# Patient Record
Sex: MALE | Race: WHITE | URBAN - METROPOLITAN AREA
[De-identification: names, ages, dates, MRNs, and addresses within clinical notes are randomized per-mention and may not be internally consistent; named-entity substitution may affect disease eponyms.]

---

## 2018-07-16 ENCOUNTER — OUTPATIENT (OUTPATIENT)
Dept: OUTPATIENT SERVICES | Facility: HOSPITAL | Age: 63
LOS: 1 days | Discharge: HOME | End: 2018-07-16

## 2018-07-16 DIAGNOSIS — I25.10 ATHEROSCLEROTIC HEART DISEASE OF NATIVE CORONARY ARTERY WITHOUT ANGINA PECTORIS: ICD-10-CM

## 2018-07-16 DIAGNOSIS — Z01.810 ENCOUNTER FOR PREPROCEDURAL CARDIOVASCULAR EXAMINATION: ICD-10-CM

## 2018-07-16 DIAGNOSIS — Z79.01 LONG TERM (CURRENT) USE OF ANTICOAGULANTS: ICD-10-CM

## 2018-07-20 ENCOUNTER — INPATIENT (INPATIENT)
Facility: HOSPITAL | Age: 63
LOS: 3 days | Discharge: HOME | End: 2018-07-24
Attending: THORACIC SURGERY (CARDIOTHORACIC VASCULAR SURGERY) | Admitting: THORACIC SURGERY (CARDIOTHORACIC VASCULAR SURGERY)

## 2018-07-20 VITALS
OXYGEN SATURATION: 100 % | HEART RATE: 62 BPM | DIASTOLIC BLOOD PRESSURE: 89 MMHG | HEIGHT: 68 IN | SYSTOLIC BLOOD PRESSURE: 140 MMHG | WEIGHT: 209.66 LBS | RESPIRATION RATE: 20 BRPM

## 2018-07-20 LAB
ABO RH CONFIRMATION: SIGNIFICANT CHANGE UP
ALBUMIN SERPL ELPH-MCNC: 4 G/DL — SIGNIFICANT CHANGE UP (ref 3.5–5.2)
ALBUMIN SERPL ELPH-MCNC: 4.2 G/DL — SIGNIFICANT CHANGE UP (ref 3.5–5.2)
ALP SERPL-CCNC: 25 U/L — LOW (ref 30–115)
ALP SERPL-CCNC: 44 U/L — SIGNIFICANT CHANGE UP (ref 30–115)
ALT FLD-CCNC: 15 U/L — SIGNIFICANT CHANGE UP (ref 0–41)
ALT FLD-CCNC: 25 U/L — SIGNIFICANT CHANGE UP (ref 0–41)
ANION GAP SERPL CALC-SCNC: 13 MMOL/L — SIGNIFICANT CHANGE UP (ref 7–14)
ANION GAP SERPL CALC-SCNC: 15 MMOL/L — HIGH (ref 7–14)
ANION GAP SERPL CALC-SCNC: 15 MMOL/L — HIGH (ref 7–14)
APTT BLD: 25 SEC — LOW (ref 27–39.2)
AST SERPL-CCNC: 19 U/L — SIGNIFICANT CHANGE UP (ref 0–41)
AST SERPL-CCNC: 28 U/L — SIGNIFICANT CHANGE UP (ref 0–41)
BASOPHILS # BLD AUTO: 0.04 K/UL — SIGNIFICANT CHANGE UP (ref 0–0.2)
BASOPHILS NFR BLD AUTO: 0.4 % — SIGNIFICANT CHANGE UP (ref 0–1)
BILIRUB SERPL-MCNC: 0.4 MG/DL — SIGNIFICANT CHANGE UP (ref 0.2–1.2)
BILIRUB SERPL-MCNC: 0.5 MG/DL — SIGNIFICANT CHANGE UP (ref 0.2–1.2)
BLD GP AB SCN SERPL QL: SIGNIFICANT CHANGE UP
BUN SERPL-MCNC: 10 MG/DL — SIGNIFICANT CHANGE UP (ref 10–20)
BUN SERPL-MCNC: 10 MG/DL — SIGNIFICANT CHANGE UP (ref 10–20)
BUN SERPL-MCNC: 13 MG/DL — SIGNIFICANT CHANGE UP (ref 10–20)
CALCIUM SERPL-MCNC: 7.5 MG/DL — LOW (ref 8.5–10.1)
CALCIUM SERPL-MCNC: 8.1 MG/DL — LOW (ref 8.5–10.1)
CALCIUM SERPL-MCNC: 8.1 MG/DL — LOW (ref 8.5–10.1)
CHLORIDE SERPL-SCNC: 100 MMOL/L — SIGNIFICANT CHANGE UP (ref 98–110)
CHLORIDE SERPL-SCNC: 108 MMOL/L — SIGNIFICANT CHANGE UP (ref 98–110)
CHLORIDE SERPL-SCNC: 108 MMOL/L — SIGNIFICANT CHANGE UP (ref 98–110)
CO2 SERPL-SCNC: 20 MMOL/L — SIGNIFICANT CHANGE UP (ref 17–32)
CO2 SERPL-SCNC: 22 MMOL/L — SIGNIFICANT CHANGE UP (ref 17–32)
CO2 SERPL-SCNC: 23 MMOL/L — SIGNIFICANT CHANGE UP (ref 17–32)
CREAT SERPL-MCNC: 1.2 MG/DL — SIGNIFICANT CHANGE UP (ref 0.7–1.5)
EOSINOPHIL # BLD AUTO: 0.11 K/UL — SIGNIFICANT CHANGE UP (ref 0–0.7)
EOSINOPHIL NFR BLD AUTO: 1 % — SIGNIFICANT CHANGE UP (ref 0–8)
GAS PNL BLDA: SIGNIFICANT CHANGE UP
GLUCOSE SERPL-MCNC: 122 MG/DL — HIGH (ref 70–99)
GLUCOSE SERPL-MCNC: 140 MG/DL — HIGH (ref 70–99)
GLUCOSE SERPL-MCNC: 166 MG/DL — HIGH (ref 70–99)
HCT VFR BLD CALC: 27.7 % — LOW (ref 42–52)
HCT VFR BLD CALC: 30.9 % — LOW (ref 42–52)
HCT VFR BLD CALC: 37 % — LOW (ref 42–52)
HGB BLD-MCNC: 11 G/DL — LOW (ref 14–18)
HGB BLD-MCNC: 13.3 G/DL — LOW (ref 14–18)
HGB BLD-MCNC: 9.9 G/DL — LOW (ref 14–18)
IMM GRANULOCYTES NFR BLD AUTO: 0.8 % — HIGH (ref 0.1–0.3)
INR BLD: 1.24 RATIO — SIGNIFICANT CHANGE UP (ref 0.65–1.3)
INR BLD: 1.53 RATIO — HIGH (ref 0.65–1.3)
LYMPHOCYTES # BLD AUTO: 1.91 K/UL — SIGNIFICANT CHANGE UP (ref 1.2–3.4)
LYMPHOCYTES # BLD AUTO: 17 % — LOW (ref 20.5–51.1)
MCHC RBC-ENTMCNC: 32.3 PG — HIGH (ref 27–31)
MCHC RBC-ENTMCNC: 32.4 PG — HIGH (ref 27–31)
MCHC RBC-ENTMCNC: 32.5 PG — HIGH (ref 27–31)
MCHC RBC-ENTMCNC: 35.6 G/DL — SIGNIFICANT CHANGE UP (ref 32–37)
MCHC RBC-ENTMCNC: 35.7 G/DL — SIGNIFICANT CHANGE UP (ref 32–37)
MCHC RBC-ENTMCNC: 35.9 G/DL — SIGNIFICANT CHANGE UP (ref 32–37)
MCV RBC AUTO: 90.2 FL — SIGNIFICANT CHANGE UP (ref 80–94)
MCV RBC AUTO: 90.6 FL — SIGNIFICANT CHANGE UP (ref 80–94)
MCV RBC AUTO: 90.8 FL — SIGNIFICANT CHANGE UP (ref 80–94)
MONOCYTES # BLD AUTO: 0.43 K/UL — SIGNIFICANT CHANGE UP (ref 0.1–0.6)
MONOCYTES NFR BLD AUTO: 3.8 % — SIGNIFICANT CHANGE UP (ref 1.7–9.3)
NEUTROPHILS # BLD AUTO: 8.66 K/UL — HIGH (ref 1.4–6.5)
NEUTROPHILS NFR BLD AUTO: 77 % — HIGH (ref 42.2–75.2)
NRBC # BLD: 0 /100 WBCS — SIGNIFICANT CHANGE UP (ref 0–0)
PLAT MORPH BLD: NORMAL — SIGNIFICANT CHANGE UP
PLATELET # BLD AUTO: 103 K/UL — LOW (ref 130–400)
PLATELET # BLD AUTO: 168 K/UL — SIGNIFICANT CHANGE UP (ref 130–400)
PLATELET # BLD AUTO: 188 K/UL — SIGNIFICANT CHANGE UP (ref 130–400)
POTASSIUM SERPL-MCNC: 4.1 MMOL/L — SIGNIFICANT CHANGE UP (ref 3.5–5)
POTASSIUM SERPL-MCNC: 4.2 MMOL/L — SIGNIFICANT CHANGE UP (ref 3.5–5)
POTASSIUM SERPL-MCNC: 5 MMOL/L — SIGNIFICANT CHANGE UP (ref 3.5–5)
POTASSIUM SERPL-SCNC: 4.1 MMOL/L — SIGNIFICANT CHANGE UP (ref 3.5–5)
POTASSIUM SERPL-SCNC: 4.2 MMOL/L — SIGNIFICANT CHANGE UP (ref 3.5–5)
POTASSIUM SERPL-SCNC: 5 MMOL/L — SIGNIFICANT CHANGE UP (ref 3.5–5)
PROT SERPL-MCNC: 5.4 G/DL — LOW (ref 6–8)
PROT SERPL-MCNC: 6.3 G/DL — SIGNIFICANT CHANGE UP (ref 6–8)
PROTHROM AB SERPL-ACNC: 13.4 SEC — HIGH (ref 9.95–12.87)
PROTHROM AB SERPL-ACNC: 16.5 SEC — HIGH (ref 9.95–12.87)
RBC # BLD: 3.05 M/UL — LOW (ref 4.7–6.1)
RBC # BLD: 3.41 M/UL — LOW (ref 4.7–6.1)
RBC # BLD: 4.1 M/UL — LOW (ref 4.7–6.1)
RBC # FLD: 12.7 % — SIGNIFICANT CHANGE UP (ref 11.5–14.5)
RBC # FLD: 12.7 % — SIGNIFICANT CHANGE UP (ref 11.5–14.5)
RBC # FLD: 12.8 % — SIGNIFICANT CHANGE UP (ref 11.5–14.5)
RBC BLD AUTO: NORMAL — SIGNIFICANT CHANGE UP
SODIUM SERPL-SCNC: 135 MMOL/L — SIGNIFICANT CHANGE UP (ref 135–146)
SODIUM SERPL-SCNC: 144 MMOL/L — SIGNIFICANT CHANGE UP (ref 135–146)
SODIUM SERPL-SCNC: 145 MMOL/L — SIGNIFICANT CHANGE UP (ref 135–146)
TYPE + AB SCN PNL BLD: SIGNIFICANT CHANGE UP
WBC # BLD: 11.24 K/UL — HIGH (ref 4.8–10.8)
WBC # BLD: 5.52 K/UL — SIGNIFICANT CHANGE UP (ref 4.8–10.8)
WBC # BLD: 8.51 K/UL — SIGNIFICANT CHANGE UP (ref 4.8–10.8)
WBC # FLD AUTO: 11.24 K/UL — HIGH (ref 4.8–10.8)
WBC # FLD AUTO: 5.52 K/UL — SIGNIFICANT CHANGE UP (ref 4.8–10.8)
WBC # FLD AUTO: 8.51 K/UL — SIGNIFICANT CHANGE UP (ref 4.8–10.8)

## 2018-07-20 RX ORDER — AMIODARONE HYDROCHLORIDE 400 MG/1
150 TABLET ORAL ONCE
Qty: 0 | Refills: 0 | Status: COMPLETED | OUTPATIENT
Start: 2018-07-20 | End: 2018-07-20

## 2018-07-20 RX ORDER — ASPIRIN/CALCIUM CARB/MAGNESIUM 324 MG
325 TABLET ORAL DAILY
Qty: 0 | Refills: 0 | Status: DISCONTINUED | OUTPATIENT
Start: 2018-07-20 | End: 2018-07-24

## 2018-07-20 RX ORDER — ALBUMIN HUMAN 25 %
1500 VIAL (ML) INTRAVENOUS ONCE
Qty: 0 | Refills: 0 | Status: COMPLETED | OUTPATIENT
Start: 2018-07-20 | End: 2018-07-20

## 2018-07-20 RX ORDER — NITROGLYCERIN 6.5 MG
15 CAPSULE, EXTENDED RELEASE ORAL
Qty: 50 | Refills: 0 | Status: DISCONTINUED | OUTPATIENT
Start: 2018-07-20 | End: 2018-07-21

## 2018-07-20 RX ORDER — ALBUMIN HUMAN 25 %
2000 VIAL (ML) INTRAVENOUS ONCE
Qty: 0 | Refills: 0 | Status: DISCONTINUED | OUTPATIENT
Start: 2018-07-20 | End: 2018-07-20

## 2018-07-20 RX ORDER — IPRATROPIUM BROMIDE 0.2 MG/ML
2 SOLUTION, NON-ORAL INHALATION EVERY 6 HOURS
Qty: 0 | Refills: 0 | Status: DISCONTINUED | OUTPATIENT
Start: 2018-07-20 | End: 2018-07-22

## 2018-07-20 RX ORDER — PHENYLEPHRINE HYDROCHLORIDE 10 MG/ML
0.1 INJECTION INTRAVENOUS
Qty: 40 | Refills: 0 | Status: DISCONTINUED | OUTPATIENT
Start: 2018-07-20 | End: 2018-07-21

## 2018-07-20 RX ORDER — DOCUSATE SODIUM 100 MG
100 CAPSULE ORAL THREE TIMES A DAY
Qty: 0 | Refills: 0 | Status: DISCONTINUED | OUTPATIENT
Start: 2018-07-20 | End: 2018-07-24

## 2018-07-20 RX ORDER — FENTANYL CITRATE 50 UG/ML
50 INJECTION INTRAVENOUS ONCE
Qty: 0 | Refills: 0 | Status: DISCONTINUED | OUTPATIENT
Start: 2018-07-20 | End: 2018-07-20

## 2018-07-20 RX ORDER — POTASSIUM CHLORIDE 20 MEQ
20 PACKET (EA) ORAL ONCE
Qty: 0 | Refills: 0 | Status: COMPLETED | OUTPATIENT
Start: 2018-07-20 | End: 2018-07-20

## 2018-07-20 RX ORDER — ALBUMIN HUMAN 25 %
1000 VIAL (ML) INTRAVENOUS ONCE
Qty: 0 | Refills: 0 | Status: DISCONTINUED | OUTPATIENT
Start: 2018-07-20 | End: 2018-07-21

## 2018-07-20 RX ORDER — SENNA PLUS 8.6 MG/1
1 TABLET ORAL EVERY 12 HOURS
Qty: 0 | Refills: 0 | Status: DISCONTINUED | OUTPATIENT
Start: 2018-07-20 | End: 2018-07-24

## 2018-07-20 RX ORDER — NOREPINEPHRINE BITARTRATE/D5W 8 MG/250ML
0.05 PLASTIC BAG, INJECTION (ML) INTRAVENOUS
Qty: 8 | Refills: 0 | Status: DISCONTINUED | OUTPATIENT
Start: 2018-07-20 | End: 2018-07-24

## 2018-07-20 RX ORDER — INSULIN HUMAN 100 [IU]/ML
1 INJECTION, SOLUTION SUBCUTANEOUS
Qty: 100 | Refills: 0 | Status: DISCONTINUED | OUTPATIENT
Start: 2018-07-20 | End: 2018-07-23

## 2018-07-20 RX ORDER — AMIODARONE HYDROCHLORIDE 400 MG/1
0.5 TABLET ORAL
Qty: 900 | Refills: 0 | Status: DISCONTINUED | OUTPATIENT
Start: 2018-07-20 | End: 2018-07-23

## 2018-07-20 RX ORDER — DEXMEDETOMIDINE HYDROCHLORIDE IN 0.9% SODIUM CHLORIDE 4 UG/ML
0.5 INJECTION INTRAVENOUS
Qty: 200 | Refills: 0 | Status: DISCONTINUED | OUTPATIENT
Start: 2018-07-20 | End: 2018-07-21

## 2018-07-20 RX ORDER — ACETAMINOPHEN 500 MG
650 TABLET ORAL EVERY 6 HOURS
Qty: 0 | Refills: 0 | Status: DISCONTINUED | OUTPATIENT
Start: 2018-07-20 | End: 2018-07-21

## 2018-07-20 RX ORDER — DEXTROSE 50 % IN WATER 50 %
25 SYRINGE (ML) INTRAVENOUS
Qty: 0 | Refills: 0 | Status: DISCONTINUED | OUTPATIENT
Start: 2018-07-20 | End: 2018-07-24

## 2018-07-20 RX ORDER — FENTANYL CITRATE 50 UG/ML
25 INJECTION INTRAVENOUS EVERY 4 HOURS
Qty: 0 | Refills: 0 | Status: DISCONTINUED | OUTPATIENT
Start: 2018-07-20 | End: 2018-07-21

## 2018-07-20 RX ORDER — PROCAINAMIDE HCL 500 MG
1000 TABLET, EXTENDED RELEASE ORAL ONCE
Qty: 0 | Refills: 0 | Status: COMPLETED | OUTPATIENT
Start: 2018-07-20 | End: 2018-07-20

## 2018-07-20 RX ORDER — FAMOTIDINE 10 MG/ML
20 INJECTION INTRAVENOUS EVERY 12 HOURS
Qty: 0 | Refills: 0 | Status: DISCONTINUED | OUTPATIENT
Start: 2018-07-20 | End: 2018-07-21

## 2018-07-20 RX ORDER — DEXTROSE 50 % IN WATER 50 %
50 SYRINGE (ML) INTRAVENOUS
Qty: 0 | Refills: 0 | Status: DISCONTINUED | OUTPATIENT
Start: 2018-07-20 | End: 2018-07-24

## 2018-07-20 RX ORDER — KETOROLAC TROMETHAMINE 30 MG/ML
15 SYRINGE (ML) INJECTION ONCE
Qty: 0 | Refills: 0 | Status: DISCONTINUED | OUTPATIENT
Start: 2018-07-20 | End: 2018-07-20

## 2018-07-20 RX ORDER — MAGNESIUM SULFATE 500 MG/ML
1 VIAL (ML) INJECTION EVERY 12 HOURS
Qty: 0 | Refills: 0 | Status: DISCONTINUED | OUTPATIENT
Start: 2018-07-20 | End: 2018-07-24

## 2018-07-20 RX ORDER — NICARDIPINE HYDROCHLORIDE 30 MG/1
5 CAPSULE, EXTENDED RELEASE ORAL
Qty: 40 | Refills: 0 | Status: DISCONTINUED | OUTPATIENT
Start: 2018-07-20 | End: 2018-07-21

## 2018-07-20 RX ORDER — ONDANSETRON 8 MG/1
4 TABLET, FILM COATED ORAL ONCE
Qty: 0 | Refills: 0 | Status: DISCONTINUED | OUTPATIENT
Start: 2018-07-20 | End: 2018-07-21

## 2018-07-20 RX ORDER — CEFAZOLIN SODIUM 1 G
1000 VIAL (EA) INJECTION EVERY 8 HOURS
Qty: 0 | Refills: 0 | Status: COMPLETED | OUTPATIENT
Start: 2018-07-20 | End: 2018-07-21

## 2018-07-20 RX ORDER — ONDANSETRON 8 MG/1
4 TABLET, FILM COATED ORAL EVERY 4 HOURS
Qty: 0 | Refills: 0 | Status: COMPLETED | OUTPATIENT
Start: 2018-07-20 | End: 2018-07-21

## 2018-07-20 RX ORDER — OXYCODONE HYDROCHLORIDE 5 MG/1
5 TABLET ORAL EVERY 4 HOURS
Qty: 0 | Refills: 0 | Status: DISCONTINUED | OUTPATIENT
Start: 2018-07-20 | End: 2018-07-24

## 2018-07-20 RX ORDER — SODIUM CHLORIDE 9 MG/ML
1000 INJECTION INTRAMUSCULAR; INTRAVENOUS; SUBCUTANEOUS
Qty: 0 | Refills: 0 | Status: DISCONTINUED | OUTPATIENT
Start: 2018-07-20 | End: 2018-07-21

## 2018-07-20 RX ORDER — MEPERIDINE HYDROCHLORIDE 50 MG/ML
25 INJECTION INTRAMUSCULAR; INTRAVENOUS; SUBCUTANEOUS ONCE
Qty: 0 | Refills: 0 | Status: DISCONTINUED | OUTPATIENT
Start: 2018-07-20 | End: 2018-07-21

## 2018-07-20 RX ORDER — AMIODARONE HYDROCHLORIDE 400 MG/1
1 TABLET ORAL
Qty: 900 | Refills: 0 | Status: DISCONTINUED | OUTPATIENT
Start: 2018-07-20 | End: 2018-07-20

## 2018-07-20 RX ORDER — ALBUTEROL 90 UG/1
2 AEROSOL, METERED ORAL EVERY 6 HOURS
Qty: 0 | Refills: 0 | Status: DISCONTINUED | OUTPATIENT
Start: 2018-07-20 | End: 2018-07-21

## 2018-07-20 RX ORDER — VASOPRESSIN 20 [USP'U]/ML
0.03 INJECTION INTRAVENOUS
Qty: 100 | Refills: 0 | Status: DISCONTINUED | OUTPATIENT
Start: 2018-07-20 | End: 2018-07-21

## 2018-07-20 RX ORDER — PROPOFOL 10 MG/ML
25 INJECTION, EMULSION INTRAVENOUS
Qty: 1000 | Refills: 0 | Status: DISCONTINUED | OUTPATIENT
Start: 2018-07-20 | End: 2018-07-21

## 2018-07-20 RX ORDER — ASPIRIN/CALCIUM CARB/MAGNESIUM 324 MG
300 TABLET ORAL ONCE
Qty: 0 | Refills: 0 | Status: DISCONTINUED | OUTPATIENT
Start: 2018-07-20 | End: 2018-07-21

## 2018-07-20 RX ORDER — OXYCODONE HYDROCHLORIDE 5 MG/1
10 TABLET ORAL EVERY 4 HOURS
Qty: 0 | Refills: 0 | Status: DISCONTINUED | OUTPATIENT
Start: 2018-07-20 | End: 2018-07-24

## 2018-07-20 RX ORDER — CHLORHEXIDINE GLUCONATE 213 G/1000ML
5 SOLUTION TOPICAL EVERY 4 HOURS
Qty: 0 | Refills: 0 | Status: DISCONTINUED | OUTPATIENT
Start: 2018-07-20 | End: 2018-07-21

## 2018-07-20 RX ORDER — KETOROLAC TROMETHAMINE 30 MG/ML
15 SYRINGE (ML) INJECTION EVERY 4 HOURS
Qty: 0 | Refills: 0 | Status: DISCONTINUED | OUTPATIENT
Start: 2018-07-20 | End: 2018-07-21

## 2018-07-20 RX ORDER — ALBUMIN HUMAN 25 %
3000 VIAL (ML) INTRAVENOUS ONCE
Qty: 0 | Refills: 0 | Status: DISCONTINUED | OUTPATIENT
Start: 2018-07-20 | End: 2018-07-20

## 2018-07-20 RX ORDER — KETOROLAC TROMETHAMINE 30 MG/ML
30 SYRINGE (ML) INJECTION EVERY 8 HOURS
Qty: 0 | Refills: 0 | Status: DISCONTINUED | OUTPATIENT
Start: 2018-07-20 | End: 2018-07-20

## 2018-07-20 RX ADMIN — Medication 15 MILLIGRAM(S): at 20:52

## 2018-07-20 RX ADMIN — Medication 100 MILLIGRAM(S): at 22:57

## 2018-07-20 RX ADMIN — Medication 100 GRAM(S): at 18:16

## 2018-07-20 RX ADMIN — Medication 15 MILLIGRAM(S): at 23:59

## 2018-07-20 RX ADMIN — FAMOTIDINE 20 MILLIGRAM(S): 10 INJECTION INTRAVENOUS at 23:44

## 2018-07-20 RX ADMIN — AMIODARONE HYDROCHLORIDE 618 MILLIGRAM(S): 400 TABLET ORAL at 18:15

## 2018-07-20 RX ADMIN — FENTANYL CITRATE 50 MICROGRAM(S): 50 INJECTION INTRAVENOUS at 19:05

## 2018-07-20 RX ADMIN — Medication 100 MILLIGRAM(S): at 16:22

## 2018-07-20 RX ADMIN — Medication 100 MILLIEQUIVALENT(S): at 20:08

## 2018-07-20 RX ADMIN — Medication 15 MILLIGRAM(S): at 21:07

## 2018-07-20 RX ADMIN — Medication 750 MILLILITER(S): at 17:15

## 2018-07-20 RX ADMIN — ONDANSETRON 4 MILLIGRAM(S): 8 TABLET, FILM COATED ORAL at 22:20

## 2018-07-20 RX ADMIN — Medication 15 MILLIGRAM(S): at 23:44

## 2018-07-20 RX ADMIN — Medication 520 MILLIGRAM(S): at 19:05

## 2018-07-20 NOTE — CHART NOTE - NSCHARTNOTEFT_GEN_A_CORE
Post-op CXR done and reviewed.  Reddell Bharti, ET tube, OG tube, chest tubes in place.  No pneumothorax, no effusion.

## 2018-07-20 NOTE — H&P CARDIOLOGY - HISTORY OF PRESENT ILLNESS
62yM PMhx of CAd s/p PCI to LAD in 2010 on aspirin and crestor presents for elective cardiac catheterization and possible intervention. has has symptoms of progressive angina over the course of the last month.

## 2018-07-20 NOTE — CONSULT NOTE ADULT - SUBJECTIVE AND OBJECTIVE BOX
Surgeon: /Nicki/ Murali    Consult requesting by:    HISTORY OF PRESENT ILLNESS:  62yM PMhx of CAd s/p PCI to LAD in 2010 on aspirin and crestor presents for elective cardiac catheterization and possible intervention. has has symptoms of progressive angina over the course of the last month. Patient with double vessel disease became unstable during procedure requiring insertion of IABP and emergent CTS consultation for myocardial revascularization      NYHA functional class    [ ] Class I (no limitation) [ ] Class II (slight limitation) [ ] Class III (marked limitation) [ ] Class IV (symptoms at rest)    PAST MEDICAL & SURGICAL HISTORY:  HTN  DLD  CAD  ASthma  PCI 2010    Home meds:  Aspir 81 oral delayed release tablet: Last Dose Taken:  , 1 tab(s) orally once a day  Crestor 40 mg oral tablet: Last Dose Taken:  , 1 tab(s) orally once a day (at bedtime)           benazepril 20 mg oral tablet: Last Dose Taken:  , 1 tab(s) orally once a day    MEDICATIONS  (STANDING):  albumin human  5% IVPB 3000 milliLiter(s) IV Intermittent once    MEDICATIONS  (PRN):    Antiplatelet therapy:          ASA                 Last dose/amt: today    Allergies    No Known Allergies    Intolerances    SOCIAL HISTORY:  Smoker:  [ ] No       ETOH use: [ ] Yes social  Ilicit Drug use:    [ ] No  Occupation: MD  Lives with: wife  Assisted device use: none  5 meter walk test: 1____sec, 2____sec, 3___sec just cathed  FAMILY HISTORY: unknown      Review of Systems  All unremarkable except for HPI and PMH  PHYSICAL EXAM  · Constitutional	Well-developed, well nourished	  · Neck	No bruits; no thyromegaly or nodules	  · Respiratory	Breath Sounds equal & clear to percussion & auscultation, no accessory muscle use	  · Cardiovascular	Regular rate & rhythm, normal S1, S2; no murmurs, gallops or rubs; no S3, S4	  · Gastrointestinal	Soft, non-tender, no hepatosplenomegaly, normal bowel sounds	  · Extremities	No cyanosis, clubbing or edema	  · Vascular	Equal and normal pulses (carotid, femoral, dorsalis pedis)	  · Neurological	Alert & oriented; no sensory, motor or coordination deficits, normal reflexes	  · Psychiatric	Affect and characteristics of appearance, verbalizations, behaviors are appropriate	  · Additional PE	Octavio's test WNL. good radial and ulnar pulses.	  no changes except right radial in place and IABP in right groin                                                          LABS:                        13.3   5.52  )-----------( 168      ( 20 Jul 2018 08:33 )             37.0     Cardiac Cath: double vessel disease    Recommendation:   CTU to OR  STS Score:     AB Only    Risk of Mortality: 3.367%    Morbidity or Mortality: 27.813%    Long Length of Stay: 4.8%    Short Length of Stay: 38.279%    Permanent Stroke: 0.799%    Prolonged Ventilation: 19.39%    DSW Infection: 0.371%    Renal Failure: 2.568%    Reoperation: 7.361%    Impression:    CAD [ ]        Assessment/ Plan:  Unstable angina  IABP  Emergent CABG

## 2018-07-20 NOTE — PROGRESS NOTE ADULT - SUBJECTIVE AND OBJECTIVE BOX
PREOPERATIVE DAY OF PROCEDURE EVALUATION:  I have personally seen and examined the patient.  I agree with the history and physical which I have reviewed and noted any changes below.  (Signed electronically)  07-20-18 @ 11:55                                              POST OPERATIVE PROCEDURAL DOCUMENTATION  PRE-OP DIAGNOSIS: unstable angina    POST-OP DIAGNOSIS: 1 V CAD    PROCEDURE:   LEFT HEART CATHERIZATION with attempted PCI    Physician: Joon Hicks MD  Assistant: Pamella Ernst Spagnola, Toreli, Soomro MD    ANESTHESIA TYPE:  [x ] Sedation  [x ] Local/Regional  [  ]General Anesthesia    ESTIMATED BLOOD LOSS:      less than 10 mL    CONDITION  [ x] Good          FINDINGS: Subtotal stenosis at the prox LAD at the insertion of D1. Patent prior placed was patent. minor irregularities in the LCx and RCA    LEFT HEART CATHETERIZATION                           FINDINGS:      PERCUTANEOUS CORONARY INTERVENTIONS: multiple attempts to pass the prox LAD lesion were unsuccessful. IABP was placed in R femoral artery      COMPLICATIONS:  None      POST-OP DIAGNOSIS: 1 V CAD      RECOMMENDATION: consultation with CT surgery Dr. Gautam for coronary bypass.

## 2018-07-20 NOTE — BRIEF OPERATIVE NOTE - PROCEDURE
<<-----Click on this checkbox to enter Procedure CABG x 2  07/20/2018  On Pump CABG x 2. LIMA to lad, RSVG to DIAG  Active  [id number]

## 2018-07-21 LAB
ALBUMIN SERPL ELPH-MCNC: 4.4 G/DL — SIGNIFICANT CHANGE UP (ref 3.5–5.2)
ALBUMIN SERPL ELPH-MCNC: 4.7 G/DL — SIGNIFICANT CHANGE UP (ref 3.5–5.2)
ALP SERPL-CCNC: 20 U/L — LOW (ref 30–115)
ALP SERPL-CCNC: 23 U/L — LOW (ref 30–115)
ALT FLD-CCNC: 11 U/L — SIGNIFICANT CHANGE UP (ref 0–41)
ALT FLD-CCNC: 11 U/L — SIGNIFICANT CHANGE UP (ref 0–41)
ANION GAP SERPL CALC-SCNC: 11 MMOL/L — SIGNIFICANT CHANGE UP (ref 7–14)
ANION GAP SERPL CALC-SCNC: 13 MMOL/L — SIGNIFICANT CHANGE UP (ref 7–14)
ANION GAP SERPL CALC-SCNC: 15 MMOL/L — HIGH (ref 7–14)
APTT BLD: 29.2 SEC — SIGNIFICANT CHANGE UP (ref 27–39.2)
AST SERPL-CCNC: 24 U/L — SIGNIFICANT CHANGE UP (ref 0–41)
AST SERPL-CCNC: 30 U/L — SIGNIFICANT CHANGE UP (ref 0–41)
BASE EXCESS BLDA CALC-SCNC: -3.3 MMOL/L — LOW (ref -2–2)
BASE EXCESS BLDMV CALC-SCNC: -2 MMOL/L — SIGNIFICANT CHANGE UP
BASOPHILS # BLD AUTO: 0.03 K/UL — SIGNIFICANT CHANGE UP (ref 0–0.2)
BASOPHILS NFR BLD AUTO: 0.3 % — SIGNIFICANT CHANGE UP (ref 0–1)
BILIRUB SERPL-MCNC: 0.8 MG/DL — SIGNIFICANT CHANGE UP (ref 0.2–1.2)
BILIRUB SERPL-MCNC: 0.9 MG/DL — SIGNIFICANT CHANGE UP (ref 0.2–1.2)
BUN SERPL-MCNC: 11 MG/DL — SIGNIFICANT CHANGE UP (ref 10–20)
BUN SERPL-MCNC: 12 MG/DL — SIGNIFICANT CHANGE UP (ref 10–20)
BUN SERPL-MCNC: 13 MG/DL — SIGNIFICANT CHANGE UP (ref 10–20)
CALCIUM SERPL-MCNC: 7.5 MG/DL — LOW (ref 8.5–10.1)
CALCIUM SERPL-MCNC: 7.7 MG/DL — LOW (ref 8.5–10.1)
CALCIUM SERPL-MCNC: 7.7 MG/DL — LOW (ref 8.5–10.1)
CHLORIDE SERPL-SCNC: 105 MMOL/L — SIGNIFICANT CHANGE UP (ref 98–110)
CHLORIDE SERPL-SCNC: 108 MMOL/L — SIGNIFICANT CHANGE UP (ref 98–110)
CHLORIDE SERPL-SCNC: 110 MMOL/L — SIGNIFICANT CHANGE UP (ref 98–110)
CO2 SERPL-SCNC: 20 MMOL/L — SIGNIFICANT CHANGE UP (ref 17–32)
CO2 SERPL-SCNC: 21 MMOL/L — SIGNIFICANT CHANGE UP (ref 17–32)
CO2 SERPL-SCNC: 23 MMOL/L — SIGNIFICANT CHANGE UP (ref 17–32)
CREAT SERPL-MCNC: 1.3 MG/DL — SIGNIFICANT CHANGE UP (ref 0.7–1.5)
CREAT SERPL-MCNC: 1.4 MG/DL — SIGNIFICANT CHANGE UP (ref 0.7–1.5)
CREAT SERPL-MCNC: 1.4 MG/DL — SIGNIFICANT CHANGE UP (ref 0.7–1.5)
EOSINOPHIL # BLD AUTO: 0.06 K/UL — SIGNIFICANT CHANGE UP (ref 0–0.7)
EOSINOPHIL NFR BLD AUTO: 0.6 % — SIGNIFICANT CHANGE UP (ref 0–8)
ESTIMATED AVERAGE GLUCOSE: 108 MG/DL — SIGNIFICANT CHANGE UP (ref 68–114)
GLUCOSE SERPL-MCNC: 126 MG/DL — HIGH (ref 70–99)
GLUCOSE SERPL-MCNC: 128 MG/DL — HIGH (ref 70–99)
GLUCOSE SERPL-MCNC: 95 MG/DL — SIGNIFICANT CHANGE UP (ref 70–99)
HBA1C BLD-MCNC: 5.4 % — SIGNIFICANT CHANGE UP (ref 4–5.6)
HCO3 BLDA-SCNC: 22 MMOL/L — LOW (ref 23–27)
HCO3 BLDMV-SCNC: 24 MMOL/L — SIGNIFICANT CHANGE UP
HCT VFR BLD CALC: 27.9 % — LOW (ref 42–52)
HCT VFR BLD CALC: 28.6 % — LOW (ref 42–52)
HGB BLD-MCNC: 9.7 G/DL — LOW (ref 14–18)
HGB BLD-MCNC: 9.8 G/DL — LOW (ref 14–18)
IMM GRANULOCYTES NFR BLD AUTO: 0.3 % — SIGNIFICANT CHANGE UP (ref 0.1–0.3)
INR BLD: 1.39 RATIO — HIGH (ref 0.65–1.3)
LYMPHOCYTES # BLD AUTO: 0.88 K/UL — LOW (ref 1.2–3.4)
LYMPHOCYTES # BLD AUTO: 8.1 % — LOW (ref 20.5–51.1)
MAGNESIUM SERPL-MCNC: 2.7 MG/DL — HIGH (ref 1.8–2.4)
MAGNESIUM SERPL-MCNC: 2.9 MG/DL — HIGH (ref 1.8–2.4)
MCHC RBC-ENTMCNC: 31.6 PG — HIGH (ref 27–31)
MCHC RBC-ENTMCNC: 32.7 PG — HIGH (ref 27–31)
MCHC RBC-ENTMCNC: 34.3 G/DL — SIGNIFICANT CHANGE UP (ref 32–37)
MCHC RBC-ENTMCNC: 34.8 G/DL — SIGNIFICANT CHANGE UP (ref 32–37)
MCV RBC AUTO: 92.3 FL — SIGNIFICANT CHANGE UP (ref 80–94)
MCV RBC AUTO: 93.9 FL — SIGNIFICANT CHANGE UP (ref 80–94)
MONOCYTES # BLD AUTO: 1.09 K/UL — HIGH (ref 0.1–0.6)
MONOCYTES NFR BLD AUTO: 10.1 % — HIGH (ref 1.7–9.3)
NEUTROPHILS # BLD AUTO: 8.71 K/UL — HIGH (ref 1.4–6.5)
NEUTROPHILS NFR BLD AUTO: 80.6 % — HIGH (ref 42.2–75.2)
NRBC # BLD: 0 /100 WBCS — SIGNIFICANT CHANGE UP (ref 0–0)
NRBC # BLD: 0 /100 WBCS — SIGNIFICANT CHANGE UP (ref 0–0)
O2 CT VFR BLD CALC: 30 MMHG — LOW (ref 35–40)
PCO2 BLDA: 38 MMHG — SIGNIFICANT CHANGE UP (ref 38–42)
PCO2 BLDMV: 44 MMHG — SIGNIFICANT CHANGE UP (ref 41–51)
PH BLDA: 7.36 — LOW (ref 7.38–7.42)
PH BLDMV: 7.34 — SIGNIFICANT CHANGE UP (ref 7.33–7.44)
PLATELET # BLD AUTO: 103 K/UL — LOW (ref 130–400)
PLATELET # BLD AUTO: 141 K/UL — SIGNIFICANT CHANGE UP (ref 130–400)
PO2 BLDA: 71 MMHG — LOW (ref 78–95)
POTASSIUM SERPL-MCNC: 4 MMOL/L — SIGNIFICANT CHANGE UP (ref 3.5–5)
POTASSIUM SERPL-MCNC: 4.2 MMOL/L — SIGNIFICANT CHANGE UP (ref 3.5–5)
POTASSIUM SERPL-MCNC: 4.6 MMOL/L — SIGNIFICANT CHANGE UP (ref 3.5–5)
POTASSIUM SERPL-SCNC: 4 MMOL/L — SIGNIFICANT CHANGE UP (ref 3.5–5)
POTASSIUM SERPL-SCNC: 4.2 MMOL/L — SIGNIFICANT CHANGE UP (ref 3.5–5)
POTASSIUM SERPL-SCNC: 4.6 MMOL/L — SIGNIFICANT CHANGE UP (ref 3.5–5)
PROT SERPL-MCNC: 5.7 G/DL — LOW (ref 6–8)
PROT SERPL-MCNC: 5.8 G/DL — LOW (ref 6–8)
PROTHROM AB SERPL-ACNC: 15 SEC — HIGH (ref 9.95–12.87)
RBC # BLD: 2.97 M/UL — LOW (ref 4.7–6.1)
RBC # BLD: 3.1 M/UL — LOW (ref 4.7–6.1)
RBC # FLD: 13.2 % — SIGNIFICANT CHANGE UP (ref 11.5–14.5)
RBC # FLD: 13.8 % — SIGNIFICANT CHANGE UP (ref 11.5–14.5)
SAO2 % BLDA: 97 % — SIGNIFICANT CHANGE UP (ref 94–98)
SAO2 % BLDMV: 62 % — LOW (ref 70–75)
SODIUM SERPL-SCNC: 141 MMOL/L — SIGNIFICANT CHANGE UP (ref 135–146)
SODIUM SERPL-SCNC: 142 MMOL/L — SIGNIFICANT CHANGE UP (ref 135–146)
SODIUM SERPL-SCNC: 143 MMOL/L — SIGNIFICANT CHANGE UP (ref 135–146)
WBC # BLD: 10.8 K/UL — SIGNIFICANT CHANGE UP (ref 4.8–10.8)
WBC # BLD: 9.29 K/UL — SIGNIFICANT CHANGE UP (ref 4.8–10.8)
WBC # FLD AUTO: 10.8 K/UL — SIGNIFICANT CHANGE UP (ref 4.8–10.8)
WBC # FLD AUTO: 9.29 K/UL — SIGNIFICANT CHANGE UP (ref 4.8–10.8)

## 2018-07-21 RX ORDER — PANTOPRAZOLE SODIUM 20 MG/1
40 TABLET, DELAYED RELEASE ORAL
Qty: 0 | Refills: 0 | Status: DISCONTINUED | OUTPATIENT
Start: 2018-07-21 | End: 2018-07-24

## 2018-07-21 RX ORDER — POTASSIUM CHLORIDE 20 MEQ
20 PACKET (EA) ORAL ONCE
Qty: 0 | Refills: 0 | Status: COMPLETED | OUTPATIENT
Start: 2018-07-21 | End: 2018-07-21

## 2018-07-21 RX ORDER — ACETAMINOPHEN 500 MG
650 TABLET ORAL ONCE
Qty: 0 | Refills: 0 | Status: DISCONTINUED | OUTPATIENT
Start: 2018-07-21 | End: 2018-07-21

## 2018-07-21 RX ORDER — COLCHICINE 0.6 MG
0.6 TABLET ORAL
Qty: 0 | Refills: 0 | Status: DISCONTINUED | OUTPATIENT
Start: 2018-07-21 | End: 2018-07-21

## 2018-07-21 RX ORDER — ACETAMINOPHEN 500 MG
1000 TABLET ORAL ONCE
Qty: 0 | Refills: 0 | Status: COMPLETED | OUTPATIENT
Start: 2018-07-21 | End: 2018-07-21

## 2018-07-21 RX ORDER — SODIUM CHLORIDE 9 MG/ML
1000 INJECTION INTRAMUSCULAR; INTRAVENOUS; SUBCUTANEOUS
Qty: 0 | Refills: 0 | Status: DISCONTINUED | OUTPATIENT
Start: 2018-07-21 | End: 2018-07-22

## 2018-07-21 RX ORDER — HYDROMORPHONE HYDROCHLORIDE 2 MG/ML
0.5 INJECTION INTRAMUSCULAR; INTRAVENOUS; SUBCUTANEOUS ONCE
Qty: 0 | Refills: 0 | Status: DISCONTINUED | OUTPATIENT
Start: 2018-07-21 | End: 2018-07-21

## 2018-07-21 RX ORDER — ACETAMINOPHEN 500 MG
650 TABLET ORAL EVERY 6 HOURS
Qty: 0 | Refills: 0 | Status: DISCONTINUED | OUTPATIENT
Start: 2018-07-21 | End: 2018-07-24

## 2018-07-21 RX ORDER — ATORVASTATIN CALCIUM 80 MG/1
40 TABLET, FILM COATED ORAL AT BEDTIME
Qty: 0 | Refills: 0 | Status: DISCONTINUED | OUTPATIENT
Start: 2018-07-21 | End: 2018-07-24

## 2018-07-21 RX ORDER — IPRATROPIUM/ALBUTEROL SULFATE 18-103MCG
3 AEROSOL WITH ADAPTER (GRAM) INHALATION EVERY 6 HOURS
Qty: 0 | Refills: 0 | Status: DISCONTINUED | OUTPATIENT
Start: 2018-07-21 | End: 2018-07-24

## 2018-07-21 RX ORDER — ACETAMINOPHEN 500 MG
650 TABLET ORAL EVERY 6 HOURS
Qty: 0 | Refills: 0 | Status: DISCONTINUED | OUTPATIENT
Start: 2018-07-21 | End: 2018-07-21

## 2018-07-21 RX ORDER — COLCHICINE 0.6 MG
0.6 TABLET ORAL EVERY 12 HOURS
Qty: 0 | Refills: 0 | Status: DISCONTINUED | OUTPATIENT
Start: 2018-07-21 | End: 2018-07-21

## 2018-07-21 RX ORDER — FUROSEMIDE 40 MG
40 TABLET ORAL ONCE
Qty: 0 | Refills: 0 | Status: COMPLETED | OUTPATIENT
Start: 2018-07-21 | End: 2018-07-21

## 2018-07-21 RX ORDER — ALBUMIN HUMAN 25 %
500 VIAL (ML) INTRAVENOUS ONCE
Qty: 0 | Refills: 0 | Status: COMPLETED | OUTPATIENT
Start: 2018-07-21 | End: 2018-07-21

## 2018-07-21 RX ORDER — FENTANYL CITRATE 50 UG/ML
25 INJECTION INTRAVENOUS
Qty: 0 | Refills: 0 | Status: DISCONTINUED | OUTPATIENT
Start: 2018-07-21 | End: 2018-07-24

## 2018-07-21 RX ADMIN — Medication 600 MILLIGRAM(S): at 17:58

## 2018-07-21 RX ADMIN — OXYCODONE HYDROCHLORIDE 5 MILLIGRAM(S): 5 TABLET ORAL at 01:15

## 2018-07-21 RX ADMIN — FENTANYL CITRATE 25 MICROGRAM(S): 50 INJECTION INTRAVENOUS at 19:15

## 2018-07-21 RX ADMIN — Medication 100 GRAM(S): at 17:58

## 2018-07-21 RX ADMIN — Medication 15 MILLIGRAM(S): at 07:45

## 2018-07-21 RX ADMIN — AMIODARONE HYDROCHLORIDE 16.67 MG/MIN: 400 TABLET ORAL at 00:15

## 2018-07-21 RX ADMIN — Medication 40 MILLIGRAM(S): at 15:30

## 2018-07-21 RX ADMIN — HYDROMORPHONE HYDROCHLORIDE 0.5 MILLIGRAM(S): 2 INJECTION INTRAMUSCULAR; INTRAVENOUS; SUBCUTANEOUS at 13:30

## 2018-07-21 RX ADMIN — FENTANYL CITRATE 25 MICROGRAM(S): 50 INJECTION INTRAVENOUS at 19:00

## 2018-07-21 RX ADMIN — FENTANYL CITRATE 25 MICROGRAM(S): 50 INJECTION INTRAVENOUS at 05:32

## 2018-07-21 RX ADMIN — ATORVASTATIN CALCIUM 40 MILLIGRAM(S): 80 TABLET, FILM COATED ORAL at 22:45

## 2018-07-21 RX ADMIN — PANTOPRAZOLE SODIUM 40 MILLIGRAM(S): 20 TABLET, DELAYED RELEASE ORAL at 11:46

## 2018-07-21 RX ADMIN — FENTANYL CITRATE 25 MICROGRAM(S): 50 INJECTION INTRAVENOUS at 08:56

## 2018-07-21 RX ADMIN — Medication 0.6 MILLIGRAM(S): at 03:22

## 2018-07-21 RX ADMIN — Medication 100 MILLIGRAM(S): at 22:46

## 2018-07-21 RX ADMIN — FENTANYL CITRATE 25 MICROGRAM(S): 50 INJECTION INTRAVENOUS at 09:15

## 2018-07-21 RX ADMIN — FENTANYL CITRATE 25 MICROGRAM(S): 50 INJECTION INTRAVENOUS at 05:17

## 2018-07-21 RX ADMIN — Medication 15 MILLIGRAM(S): at 08:00

## 2018-07-21 RX ADMIN — FENTANYL CITRATE 25 MICROGRAM(S): 50 INJECTION INTRAVENOUS at 15:30

## 2018-07-21 RX ADMIN — Medication 400 MILLIGRAM(S): at 06:00

## 2018-07-21 RX ADMIN — Medication 3 MILLILITER(S): at 21:14

## 2018-07-21 RX ADMIN — Medication 250 MILLILITER(S): at 09:05

## 2018-07-21 RX ADMIN — Medication 100 MILLIGRAM(S): at 06:00

## 2018-07-21 RX ADMIN — Medication 8.92 MICROGRAM(S)/KG/MIN: at 20:40

## 2018-07-21 RX ADMIN — FENTANYL CITRATE 25 MICROGRAM(S): 50 INJECTION INTRAVENOUS at 22:01

## 2018-07-21 RX ADMIN — Medication 3 MILLILITER(S): at 12:23

## 2018-07-21 RX ADMIN — Medication 20 MILLIEQUIVALENT(S): at 15:18

## 2018-07-21 RX ADMIN — Medication 100 GRAM(S): at 06:03

## 2018-07-21 RX ADMIN — Medication 0.6 MILLIGRAM(S): at 17:58

## 2018-07-21 RX ADMIN — FENTANYL CITRATE 25 MICROGRAM(S): 50 INJECTION INTRAVENOUS at 12:00

## 2018-07-21 RX ADMIN — ONDANSETRON 4 MILLIGRAM(S): 8 TABLET, FILM COATED ORAL at 11:30

## 2018-07-21 RX ADMIN — Medication 650 MILLIGRAM(S): at 00:35

## 2018-07-21 RX ADMIN — Medication 2 PUFF(S): at 01:57

## 2018-07-21 RX ADMIN — OXYCODONE HYDROCHLORIDE 5 MILLIGRAM(S): 5 TABLET ORAL at 01:00

## 2018-07-21 RX ADMIN — FAMOTIDINE 20 MILLIGRAM(S): 10 INJECTION INTRAVENOUS at 06:02

## 2018-07-21 RX ADMIN — FENTANYL CITRATE 25 MICROGRAM(S): 50 INJECTION INTRAVENOUS at 15:45

## 2018-07-21 RX ADMIN — HYDROMORPHONE HYDROCHLORIDE 0.5 MILLIGRAM(S): 2 INJECTION INTRAMUSCULAR; INTRAVENOUS; SUBCUTANEOUS at 13:45

## 2018-07-21 RX ADMIN — ALBUTEROL 2 PUFF(S): 90 AEROSOL, METERED ORAL at 01:57

## 2018-07-21 RX ADMIN — Medication 100 MILLIGRAM(S): at 06:01

## 2018-07-21 RX ADMIN — FENTANYL CITRATE 25 MICROGRAM(S): 50 INJECTION INTRAVENOUS at 11:45

## 2018-07-21 RX ADMIN — Medication 325 MILLIGRAM(S): at 11:46

## 2018-07-21 RX ADMIN — ONDANSETRON 4 MILLIGRAM(S): 8 TABLET, FILM COATED ORAL at 19:05

## 2018-07-21 RX ADMIN — Medication 600 MILLIGRAM(S): at 06:00

## 2018-07-21 RX ADMIN — Medication 100 MILLIGRAM(S): at 15:17

## 2018-07-21 RX ADMIN — FENTANYL CITRATE 25 MICROGRAM(S): 50 INJECTION INTRAVENOUS at 22:15

## 2018-07-21 RX ADMIN — Medication 650 MILLIGRAM(S): at 18:00

## 2018-07-21 RX ADMIN — Medication 650 MILLIGRAM(S): at 18:30

## 2018-07-21 NOTE — PROGRESS NOTE ADULT - SUBJECTIVE AND OBJECTIVE BOX
CTU Attending Progress Daily Note     21 Jul 2018 11:10  POD#         1      Procedure:  CABG  Interval event for past 24 hr:  RONALD MAHER  62y had hypotension, oliguria    HPI:  62yM PMhx of CAd s/p PCI to LAD in 2010 on aspirin and crestor presented for elective cardiac catheterization which showed 2 vessels CAD SP CABG yesterday. has had symptoms of progressive angina over the course of the last month.     Current Complains:  RONALD MAHER has new complaints of post op pain    REVIEW OF SYSTEMS:  CONSTITUTIONAL:  [-] weakness, [-] fevers, [-] chills  EYES/ENT: [-] visual changes, [-] vertigo, [-] throat pain   NECK: [-] pain, [-] stiffness  RESPIRATORY: [-] cough, [-] wheezing, [-] hemoptysis, [-] shortness of breath  CARDIOVASCULAR: [-] chest pain, [-] palpitations, [-] orthopnea  GASTROINTESTINAL:    [-]abdominal pain, [+] nausea, [-] vomiting, [-] hematemesis, [-] diarrhea, [-] constipation, [-] melena, [-] hematochezia.  GENITOURINARY: [-] dysuria, [-] frequency, [-] hematuria  NEUROLOGICAL: [-] numbness, [-] weakness  SKIN: [-] itching, [-] burning, [-] rashes, [-] lesions   All other review of systems is negative unless indicated above.    [  ] Unable to assess ROS because :    OBJECTIVE:  ICU Vital Signs Last 24 Hrs  T(C): 37.3 (21 Jul 2018 10:00), Max: 38.3 (21 Jul 2018 00:00)  T(F): 99.1 (21 Jul 2018 10:00), Max: 101 (21 Jul 2018 00:00)  HR: 64 (21 Jul 2018 10:15) (58 - 100)  BP: 104/55 (21 Jul 2018 07:45) (79/47 - 104/55)  BP(mean): 72 (21 Jul 2018 07:45) (56 - 72)  ABP: 132/50 (21 Jul 2018 10:15) (88/40 - 134/54)  ABP(mean): 74 (21 Jul 2018 10:15) (56 - 82)  RR: 25 (21 Jul 2018 10:15) (6 - 104)  SpO2: 100% (21 Jul 2018 10:15) (92% - 100%)      I&O's Summary    20 Jul 2018 07:01  -  21 Jul 2018 07:00  --------------------------------------------------------  IN: 2911.1 mL / OUT: 1874 mL / NET: 1037.1 mL    21 Jul 2018 07:01  -  21 Jul 2018 11:10  --------------------------------------------------------  IN: 687 mL / OUT: 194 mL / NET: 493 mL      Adult Advanced Hemodynamics Last 24 Hrs  CVP(mm Hg): 16 (21 Jul 2018 10:15) (12 - 32)  CVP(cm H2O): --  CO: 7.2 (21 Jul 2018 09:15) (5.4 - 9)  CI: 3.5 (21 Jul 2018 09:15) (2.6 - 4.3)  PA: 35/16 (21 Jul 2018 10:15) (26/14 - 50/20)  PA(mean): 25 (21 Jul 2018 10:15) (19 - 45)  PCWP: --  SVR: 588 (21 Jul 2018 09:15) (381 - 730)  SVRI: 1209 (21 Jul 2018 09:15) (799 - 1568)  PVR: --  PVRI: --  Mode: CPAP with PS  FiO2: 50  PEEP: 5  PS: 7      PHYSICAL EXAM:  General: WN/WD NAD    HEENT:     [+] NCAT  [+] EOMI  [-] Conjuctival edema   [-] Icterus   [-] Thrush   [-] ETT  [-] NGT/OGT    Neck:         [+] FROM   [-] JVD     [-] Nodes     [-] Masses    [+] Mid-line trachea    [-] Tracheostomy    Chest:         [-] Sternal click   [-] Sternal drainage   [+] Pacing wires   [+] Chest tubes   [-] SubQ emphysema    Lungs:          [+] CTA   [-] Rhonchi   [-] Rales    [-] Wheezing    [-] Decreased BS    [-] Dullness R L    Cardiac:       [+] S1 [+] S2    [+] RRR   [-] Irregular   [-] S3   [-] S4    [-] Murmurs    [-] Rub    Abdomen:    [+] BS    [+] Soft    [+] Non-tender     [-] Distended    [-] Organomegaly  [-] PEG    Extremities:   [-] Cyanosis U/L   [-] Clubbing  U/L  [-] LE/UE Edema   [+] Capillary refill    [+] Pulses     Neuro:        [+] Awake   [+]  Alert   [-] Confused   [-] Lethargic   [-] Sedated   [-] Generalized Weakness    Skin:        [-] Rashes    [-] Erythema   [+] Normal incisions   [+] IV sites intact   [-] Sacral decubitus    Tubes:  LINES:    CAPILLARY BLOOD GLUCOSE  134 (21 Jul 2018 08:00)        CAPILLARY BLOOD GLUCOSE  134 (21 Jul 2018 08:00)  136 (21 Jul 2018 06:33)  128 (21 Jul 2018 04:00)  135 (21 Jul 2018 02:00)  147 (21 Jul 2018 00:28)  131 (20 Jul 2018 22:05)  133 (20 Jul 2018 20:23)  173 (20 Jul 2018 19:00)  96 (20 Jul 2018 17:15)  162 (20 Jul 2018 15:00)  156 (20 Jul 2018 14:57)          HOSPITAL MEDICATIONS:  MEDICATIONS  (STANDING):  ALBUTerol/ipratropium for Nebulization 3 milliLiter(s) Nebulizer every 6 hours  amiodarone Infusion 0.5 mG/Min (16.667 mL/Hr) IV Continuous <Continuous>  aspirin enteric coated 325 milliGRAM(s) Oral daily  atorvastatin 40 milliGRAM(s) Oral at bedtime  colchicine 0.6 milliGRAM(s) Oral every 12 hours  dextrose 50% Injectable 50 milliLiter(s) IV Push every 15 minutes  dextrose 50% Injectable 25 milliLiter(s) IV Push every 15 minutes  docusate sodium 100 milliGRAM(s) Oral three times a day  guaiFENesin  milliGRAM(s) Oral every 12 hours  insulin Infusion 1 Unit(s)/Hr (1 mL/Hr) IV Continuous <Continuous>  ipratropium 17 MICROgram(s) HFA Inhaler 2 Puff(s) Inhalation every 6 hours  magnesium sulfate  IVPB 1 Gram(s) IV Intermittent every 12 hours  norepinephrine Infusion 0.05 MICROgram(s)/kG/Min (8.916 mL/Hr) IV Continuous <Continuous>  pantoprazole    Tablet 40 milliGRAM(s) Oral before breakfast  sodium chloride 0.9%. 1000 milliLiter(s) (50 mL/Hr) IV Continuous <Continuous>    MEDICATIONS  (PRN):  fentaNYL    Injectable 25 MICROGram(s) IV Push every 1 hour PRN Severe Pain (7 - 10)  ondansetron Injectable 4 milliGRAM(s) IV Push every 4 hours PRN Nausea and/or Vomiting  oxyCODONE    IR 10 milliGRAM(s) Oral every 4 hours PRN Severe Pain (7 - 10)  oxyCODONE    IR 5 milliGRAM(s) Oral every 4 hours PRN Moderate Pain (4 - 6)  senna 1 Tablet(s) Oral every 12 hours PRN Constipation      LABS:  ABG - ( 21 Jul 2018 04:00 )  pH, Arterial: 7.36  pH, Blood: x     /  pCO2: 38    /  pO2: 71    / HCO3: 22    / Base Excess: -3.3  /  SaO2: 97                                      9.8    10.80 )-----------( 141      ( 21 Jul 2018 02:03 )             28.6     07-21    143  |  108  |  11  ----------------------------<  128<H>  4.6   |  20  |  1.3    Ca    7.7<L>      21 Jul 2018 02:03  Mg     2.7     07-21    TPro  5.7<L>  /  Alb  4.7  /  TBili  0.9  /  DBili  x   /  AST  24  /  ALT  11  /  AlkPhos  20<L>  07-21    PT/INR - ( 21 Jul 2018 02:03 )   PT: 15.00 sec;   INR: 1.39 ratio         PTT - ( 21 Jul 2018 02:03 )  PTT:29.2 sec        RADIOLOGY:  Reviewed and interpreted by me  CXR from 07-21-18 shows [+] mild congestion, [-] pneumothorax, [-] R/L effusion, [-] cardiomegaly,   S-G Catheter in place, R/L TLC in place, R/L Chest Tubes in place    ECG:  Reviewed and interpreted by me: SR  QTC: WNL    Assessment:  CAD SP CABG  Hypotension on levophed  oliguria      PLAN:  Neuro: Pain control  Pulm: Encourage coughing, deep breathing and use of incentive spirometry. Wean off supplemental oxygen as able. Daily CXR.   Cardio: Monitor telemetry/alarms. ASA, lopressor when off pressors  GI: Tolerating diet. Continue stool softeners. Continue GI prophylaxis  Renal: monitor urine output, supplement electrolytes as needed  Vasc: Heparin SC/SCDs for DVT prophylaxis  Heme: Monitor H/H.   ID: Off antibiotics. Stable.  Endocrine: Monitor finger stick blood sugar, lipitor  Physical Therapy: OOB/ambulate  Tubes: Monitor Chest tube output      Discussed with Cardiothoracic Team at AM rounds.

## 2018-07-21 NOTE — CONSULT NOTE ADULT - SUBJECTIVE AND OBJECTIVE BOX
HPI:  62yM PMhx of CAd s/p PCI to LAD in 2010 on aspirin and crestor presents for elective cardiac catheterization and possible intervention. has has symptoms of progressive angina over the course of the last month. (20 Jul 2018 06:39). s/p cabg on 7/20      PAST MEDICAL & SURGICAL HISTORY: HTN, CAD, DLD      Hospital Course:    TODAY'S SUBJECTIVE & REVIEW OF SYMPTOMS:     Constitutional WNL   Cardio WNL   Resp WNL   GI WNL  Heme WNL  Endo WNL  Skin WNL  MSK surgical site pain  Neuro WNL  Cognitive WNL  Psych WNL      MEDICATIONS  (STANDING):  ALBUTerol/ipratropium for Nebulization 3 milliLiter(s) Nebulizer every 6 hours  amiodarone Infusion 0.5 mG/Min (16.667 mL/Hr) IV Continuous <Continuous>  aspirin enteric coated 325 milliGRAM(s) Oral daily  atorvastatin 40 milliGRAM(s) Oral at bedtime  colchicine 0.6 milliGRAM(s) Oral every 12 hours  dextrose 50% Injectable 50 milliLiter(s) IV Push every 15 minutes  dextrose 50% Injectable 25 milliLiter(s) IV Push every 15 minutes  docusate sodium 100 milliGRAM(s) Oral three times a day  guaiFENesin  milliGRAM(s) Oral every 12 hours  insulin Infusion 1 Unit(s)/Hr (1 mL/Hr) IV Continuous <Continuous>  ipratropium 17 MICROgram(s) HFA Inhaler 2 Puff(s) Inhalation every 6 hours  magnesium sulfate  IVPB 1 Gram(s) IV Intermittent every 12 hours  norepinephrine Infusion 0.05 MICROgram(s)/kG/Min (8.916 mL/Hr) IV Continuous <Continuous>  pantoprazole    Tablet 40 milliGRAM(s) Oral before breakfast  sodium chloride 0.9%. 1000 milliLiter(s) (50 mL/Hr) IV Continuous <Continuous>    MEDICATIONS  (PRN):  fentaNYL    Injectable 25 MICROGram(s) IV Push every 1 hour PRN Severe Pain (7 - 10)  ondansetron Injectable 4 milliGRAM(s) IV Push every 4 hours PRN Nausea and/or Vomiting  oxyCODONE    IR 10 milliGRAM(s) Oral every 4 hours PRN Severe Pain (7 - 10)  oxyCODONE    IR 5 milliGRAM(s) Oral every 4 hours PRN Moderate Pain (4 - 6)  senna 1 Tablet(s) Oral every 12 hours PRN Constipation      FAMILY HISTORY:      Allergies    No Known Allergies    Intolerances        SOCIAL HISTORY:    [  ] Etoh  [  ] Smoking  [  ] Substance abuse     Home Environment:  [  ] Home Alone  [x  ] Lives with Family  [  ] Home Health Aid    Dwelling:  [  ] Apartment  [x  ] Private House  [  ] Adult Home  [  ] Skilled Nursing Facility      [  ] Short Term  [  ] Long Term  [x  ] Stairs       Elevator [  ]    FUNCTIONAL STATUS PTA: (Check all that apply)  Ambulation: [ x  ]Independent    [  ] Dependent     [  ] Non-Ambulatory  Assistive Device: [  ] SA Cane  [  ]  Q Cane  [  ] Walker  [  ]  Wheelchair  ADL : [x  ] Independent  [  ]  Dependent       Vital Signs Last 24 Hrs  T(C): 37.3 (21 Jul 2018 10:00), Max: 38.3 (21 Jul 2018 00:00)  T(F): 99.1 (21 Jul 2018 10:00), Max: 101 (21 Jul 2018 00:00)  HR: 64 (21 Jul 2018 10:15) (58 - 100)  BP: 104/55 (21 Jul 2018 07:45) (79/47 - 104/55)  BP(mean): 72 (21 Jul 2018 07:45) (56 - 72)  RR: 25 (21 Jul 2018 10:15) (6 - 104)  SpO2: 100% (21 Jul 2018 10:15) (92% - 100%)      PHYSICAL EXAM: Alert & Oriented X3  GENERAL: NAD, well-groomed, well-developed  HEAD:  Atraumatic, Normocephalic  CHEST/LUNG: Clear   HEART: S1S2+  ABDOMEN: Soft, Nontender  EXTREMITIES:  no calf tenderness    NERVOUS SYSTEM:  Cranial Nerves 2-12 intact [  ] Abnormal  [  ]  ROM: WFL all extremities [x  ]  Abnormal [  ]  Motor Strength: WFL all extremities  [x  ]  Abnormal [  ]  Sensation: intact to light touch [x  ] Abnormal [  ]  Reflexes: Symmetric [  ]  Abnormal [  ]    FUNCTIONAL STATUS:  Bed Mobility: Independent [  ]  Supervision [  ]  Needs Assistance [x  ]  N/A [  ]  Transfers: Independent [  ]  Supervision [  ]  Needs Assistance [x  ]  N/A [  ]   Ambulation: Independent [  ]  Supervision [  ]  Needs Assistance [  ]  N/A [  ]  ADL: Independent [  ] Requires Assistance [  ] N/A [  ]      LABS:                        9.8    10.80 )-----------( 141      ( 21 Jul 2018 02:03 )             28.6     07-21    143  |  108  |  11  ----------------------------<  128<H>  4.6   |  20  |  1.3    Ca    7.7<L>      21 Jul 2018 02:03  Mg     2.7     07-21    TPro  5.7<L>  /  Alb  4.7  /  TBili  0.9  /  DBili  x   /  AST  24  /  ALT  11  /  AlkPhos  20<L>  07-21    PT/INR - ( 21 Jul 2018 02:03 )   PT: 15.00 sec;   INR: 1.39 ratio         PTT - ( 21 Jul 2018 02:03 )  PTT:29.2 sec      RADIOLOGY & ADDITIONAL STUDIES:    Assesment:

## 2018-07-21 NOTE — CONSULT NOTE ADULT - ASSESSMENT
IMPRESSION: Rehab of cardiac debilitation    PRECAUTIONS: [  ] Cardiac  [  ] Respiratory  [  ] Seizures [  ] Contact Isolation  [  ] Droplet Isolation  [  ] Other    Weight Bearing Status:     RECOMMENDATION:    Out of Bed to Chair     DVT/Decubiti Prophylaxis    REHAB PLAN:     [ x  ] Bedside P/T 3-5 times a week   [ x  ]   Bedside O/T  2-3 times a week             [   ] No Rehab Therapy Indicated                   [   ]  Speech Therapy   Conditioning/ROM                                    ADL  Bed Mobility                                               Conditioning/ROM  Transfers                                                     Bed Mobility  Sitting /Standing Balance                         Transfers                                        Gait Training                                               Sitting/Standing Balance  Stair Training [   ]Applicable                    Home equipment Eval                                                                        Splinting  [   ] Only      GOALS:   ADL   [x   ]   Independent                    Transfers  [ x  ] Independent                          Ambulation  [ x  ] Independent     [  x  ] With device                            [   ]  CG                                                         [   ]  CG                                                                  [   ] CG                            [    ] Min A                                                   [   ] Min A                                                              [   ] Min  A          DISCHARGE PLAN:   [   ]  Good candidate for Intensive Rehabilitation/Hospital based-[facility]A [facility]                                             Will tolerate 3hrs Intensive Rehab Daily                                       [    ]  Short Term Rehab in Skilled Nursing Facility                                       [  x  ]  Home with Outpatient or VN services                                         [    ]  Possible Candidate for Intensive Hospital based Rehab IMPRESSION: Rehab of cardiac debilitation    PRECAUTIONS: [x  ] Cardiac  [  ] Respiratory  [  ] Seizures [  ] Contact Isolation  [  ] Droplet Isolation  [  ] Other    Weight Bearing Status:     RECOMMENDATION:    Out of Bed to Chair     DVT/Decubiti Prophylaxis    REHAB PLAN:     [ x  ] Bedside P/T 3-5 times a week   [ x  ]   Bedside O/T  2-3 times a week             [   ] No Rehab Therapy Indicated                   [   ]  Speech Therapy   Conditioning/ROM                                    ADL  Bed Mobility                                               Conditioning/ROM  Transfers                                                     Bed Mobility  Sitting /Standing Balance                         Transfers                                        Gait Training                                               Sitting/Standing Balance  Stair Training [   ]Applicable                    Home equipment Eval                                                                        Splinting  [   ] Only      GOALS:   ADL   [x   ]   Independent                    Transfers  [ x  ] Independent                          Ambulation  [ x  ] Independent     [  x  ] With device                            [   ]  CG                                                         [   ]  CG                                                                  [   ] CG                            [    ] Min A                                                   [   ] Min A                                                              [   ] Min  A          DISCHARGE PLAN:   [   ]  Good candidate for Intensive Rehabilitation/Hospital based-[facility]A [facility]                                             Will tolerate 3hrs Intensive Rehab Daily                                       [    ]  Short Term Rehab in Skilled Nursing Facility                                       [  x  ]  Home with Outpatient or  services                                         [    ]  Possible Candidate for Intensive Hospital based Rehab

## 2018-07-21 NOTE — PROGRESS NOTE ADULT - SUBJECTIVE AND OBJECTIVE BOX
OPERATIVE PROCEDURE(s):    CABG x 2            POD # 1                      62yMale  SURGEON(s): RIN Gautam  SUBJECTIVE ASSESSMENT:  Patient complaining of pain at the chest tube sites  Vital Signs Last 24 Hrs  T(F): 99.3 (2018 12:00), Max: 101 (2018 00:00)  HR: 60 (2018 12:00) (58 - 100)  BP: 104/55 (2018 07:45) (79/47 - 104/55)  BP(mean): 72 (2018 07:45) (56 - 72)  ABP: 118/40 (2018 12:00) (88/40 - 140/52)  ABP(mean): 64 (2018 12:00)  RR: 27 (2018 11:00) (6 - 104)  SpO2: 99% (2018 12:00) (92% - 100%)  CVP(mm Hg): 16 (2018 10:30)  CVP(cm H2O): --  CO: 7.2 (2018 09:15)  CI: 3.5 (2018 09:15)  PA: 40/16 (2018 10:30)  SVR: 588 (2018 09:15)  Mode: CPAP with PS  FiO2: 50  PEEP: 5  PS: 7    I&O's Detail    2018 07:01  -  2018 07:00  --------------------------------------------------------  IN:    Albumin 5%  - 500 mL: 1500 mL    amiodarone Infusion: 135.3 mL    amiodarone Infusion: 133.4 mL    insulin Infusion: 44 mL    IV PiggyBack: 500 mL    niCARdipine Infusion: 25 mL    nitroglycerin  Infusion: 66 mL    norepinephrine Infusion: 86 mL    Oral Fluid: 70 mL    propofol Infusion: 6 mL    sodium chloride 0.9%: 340 mL    vasopressin Infusion: 5.4 mL  Total IN: 2911.1 mL    OUT:    Chest Tube: 230 mL    Chest Tube: 530 mL    Indwelling Catheter - Urethral: 1114 mL  Total OUT: 1874 mL        Net:   I&O's Detail    2018 07:01  -  2018 07:00  --------------------------------------------------------  Total NET: 1037.1 mL        CAPILLARY BLOOD GLUCOSE  141 (2018 12:00)  134 (2018 08:00)  136 (2018 06:33)  128 (2018 04:00)  135 (2018 02:00)  147 (2018 00:28)  131 (2018 22:05)  133 (2018 20:23)  173 (2018 19:00)  96 (2018 17:15)  162 (2018 15:00)  156 (2018 14:57)        Physical Exam:  General: NAD; A&Ox3  Cardiac: S1/S2, RRR, no murmur, no rubs  Lungs: unlabored respirations, CTA b/l, no wheeze, no rales, no crackles  Abdomen: Soft/NT/ND; positive bowel sounds x 4  Sternum: Intact, no click, incision healing well with no drainage  Incisions: Incisions clean/dry/intact  Extremities: No edema b/l lower extremities; good capillary refill; no cyanosis; palpable 1+ pedal pulses b/l    Central Venous Catheter: Yes[x]  No[] , If Yes indication:     unstable      Day #1  Ramirez Catheter: Yes  [x] , No  [] , If yes indication:  unstable                    Day #1  NGT: Yes [] No [x] ,    If Yes Placement:                                     Day #  EPICARDIAL WIRES:  [x] YES [] NO                                              Day #1  BOWEL MOVEMENT:  [] YES [x] NO, If No, Timing since last BM:      Day #  CHEST TUBE(Left/Right):  [x] YES [] NO, If yes -  AIR LEAKS:  [] YES [x] NO        LABS:                        9.8<L>  10.80 )-----------( 141      ( 2018 02:03 )             28.6<L>                        11.0<L>  11.24<H> )-----------( 103<L>    ( 2018 15:20 )             30.9<L>    07-21    142  |  110  |  12  ----------------------------<  126<H>  4.2   |  21  |  1.4      143  |  108  |  11  ----------------------------<  128<H>  4.6   |  20  |  1.3    Ca    7.7<L>      2018 11:00  Mg     2.7         TPro  5.7<L> [6.0 - 8.0]  /  Alb  4.7 [3.5 - 5.2]  /  TBili  0.9 [0.2 - 1.2]  /  DBili  x   /  AST  24 [0 - 41]  /  ALT  11 [0 - 41]  /  AlkPhos  20<L> [30 - 115]      PT/INR - ( 2018 02:03 )   PT: ;   INR: 1.39 ratio         PT/INR - ( 2018 15:20 )   PT: ;   INR: 1.53 ratio         PTT - ( 2018 02:03 )  PTT:29.2 sec, PTT - ( 2018 15:20 )  PTT:25.0 sec    ABG - ( 2018 04:00 )  pH: 7.36  /  pCO2: 38    /  pO2: 71    / HCO3: 22    / Base Excess: -3.3  /  SaO2: 97    /  LA: 0.9        RADIOLOGY & ADDITIONAL TESTS:  CXR:   EK Lead ECG:   Systolic  mmHg    Diastolic BP 57 mmHg    Ventricular Rate 64 BPM    Atrial Rate 64 BPM    P-R Interval 158 ms    QRS Duration 100 ms    Q-T Interval 404 ms    QTC Calculation(Bezet) 417 ms    P Axis 51 degrees    R Axis 36 degrees    T Axis 93 degrees    Diagnosis Line Normal sinus rhythm  Early transition  Incomplete right bundle branch block  T wave abnormality, consider lateral ischemia  Abnormal ECG    Confirmed by TANESHA CARRASQUILLO, DONA ([id number]) on 2018 9:11:57 AM (18 @ 05:41)    MEDICATIONS  (STANDING):  ALBUTerol/ipratropium for Nebulization 3 milliLiter(s) Nebulizer every 6 hours  amiodarone Infusion 0.5 mG/Min (16.667 mL/Hr) IV Continuous <Continuous>  aspirin enteric coated 325 milliGRAM(s) Oral daily  atorvastatin 40 milliGRAM(s) Oral at bedtime  colchicine 0.6 milliGRAM(s) Oral every 12 hours  dextrose 50% Injectable 50 milliLiter(s) IV Push every 15 minutes  dextrose 50% Injectable 25 milliLiter(s) IV Push every 15 minutes  docusate sodium 100 milliGRAM(s) Oral three times a day  guaiFENesin  milliGRAM(s) Oral every 12 hours  HYDROmorphone  Injectable 0.5 milliGRAM(s) IV Push once  insulin Infusion 1 Unit(s)/Hr (1 mL/Hr) IV Continuous <Continuous>  ipratropium 17 MICROgram(s) HFA Inhaler 2 Puff(s) Inhalation every 6 hours  magnesium sulfate  IVPB 1 Gram(s) IV Intermittent every 12 hours  norepinephrine Infusion 0.05 MICROgram(s)/kG/Min (8.916 mL/Hr) IV Continuous <Continuous>  pantoprazole    Tablet 40 milliGRAM(s) Oral before breakfast  sodium chloride 0.9%. 1000 milliLiter(s) (50 mL/Hr) IV Continuous <Continuous>    MEDICATIONS  (PRN):  fentaNYL    Injectable 25 MICROGram(s) IV Push every 1 hour PRN Severe Pain (7 - 10)  ondansetron Injectable 4 milliGRAM(s) IV Push every 4 hours PRN Nausea and/or Vomiting  oxyCODONE    IR 10 milliGRAM(s) Oral every 4 hours PRN Severe Pain (7 - 10)  oxyCODONE    IR 5 milliGRAM(s) Oral every 4 hours PRN Moderate Pain (4 - 6)  senna 1 Tablet(s) Oral every 12 hours PRN Constipation    HEPARIN:  [] YES [x] NO  Dose: XX UNITS/HR UNITS Q8H  LOVENOX:[] YES [x] NO  Dose: XX mg Q24H  COUMADIN: []  YES [x] NO  Dose: XX mg  Q24H  SCD's: YES b/l  GI Prophylaxis: Protonix [x], Pepcid [], None [], (Contra-indication:.....)  No pharm DVT Proph at this time as per Dr. Gautam, Pt high risk for bleeding    Post-Op Aspirin: Yes [],  No [], If No, then contra-indication:  Post-Op Statin: Yes [], No[], If No, then contra-indication:  Post-Op Beta-Blockers: Yes [], No [], If No, then contra-indication:    Allergies:  No Known Allergies      Ambulation/Activity Status: Ambulates several times daily without assistance.    Assessment/Plan:  62y Male status-post CABG  - Case and plan discussed with CTU Intensivist and CT Surgeon - Dr. Gautam/Murali/Nicki   - Continue CTU supportive care    - Continue DVT/GI prophylaxis  - Incentive Spirometry 10 times an hour  - Continue to advance physical activity as tolerated and continue PT/OT as directed  1. CAD: Continue ASA, statin, no BB at this time pt is hypotensive requiring pressors  2. D/C swan cat, keep ramirez a-line, and chest tubes for further monitoring  3. OOB to ambulate  4. fentanyl, oxycodone, tylenol for pain managment.  D/C torodol  5. NS at 50 cc/hr  6. lasix 40 IV x 1 dose OPERATIVE PROCEDURE(s):    CABG x 2            POD # 1                      62yMale  SURGEON(s): RIN Gautam  SUBJECTIVE ASSESSMENT:  Patient complaining of pain at the chest tube sites  Vital Signs Last 24 Hrs  T(F): 99.3 (2018 12:00), Max: 101 (2018 00:00)  HR: 60 (2018 12:00) (58 - 100)  BP: 104/55 (2018 07:45) (79/47 - 104/55)  BP(mean): 72 (2018 07:45) (56 - 72)  ABP: 118/40 (2018 12:00) (88/40 - 140/52)  ABP(mean): 64 (2018 12:00)  RR: 27 (2018 11:00) (6 - 104)  SpO2: 99% (2018 12:00) (92% - 100%)  CVP(mm Hg): 16 (2018 10:30)  CVP(cm H2O): --  CO: 7.2 (2018 09:15)  CI: 3.5 (2018 09:15)  PA: 40/16 (2018 10:30)  SVR: 588 (2018 09:15)  Mode: CPAP with PS  FiO2: 50  PEEP: 5  PS: 7    I&O's Detail    2018 07:01  -  2018 07:00  --------------------------------------------------------  IN:    Albumin 5%  - 500 mL: 1500 mL    amiodarone Infusion: 135.3 mL    amiodarone Infusion: 133.4 mL    insulin Infusion: 44 mL    IV PiggyBack: 500 mL    niCARdipine Infusion: 25 mL    nitroglycerin  Infusion: 66 mL    norepinephrine Infusion: 86 mL    Oral Fluid: 70 mL    propofol Infusion: 6 mL    sodium chloride 0.9%: 340 mL    vasopressin Infusion: 5.4 mL  Total IN: 2911.1 mL    OUT:    Chest Tube: 230 mL    Chest Tube: 530 mL    Indwelling Catheter - Urethral: 1114 mL  Total OUT: 1874 mL        Net:   I&O's Detail    2018 07:01  -  2018 07:00  --------------------------------------------------------  Total NET: 1037.1 mL        CAPILLARY BLOOD GLUCOSE  141 (2018 12:00)  134 (2018 08:00)  136 (2018 06:33)  128 (2018 04:00)  135 (2018 02:00)  147 (2018 00:28)  131 (2018 22:05)  133 (2018 20:23)  173 (2018 19:00)  96 (2018 17:15)  162 (2018 15:00)  156 (2018 14:57)        Physical Exam:  General: NAD; A&Ox3  Cardiac: S1/S2, RRR, no murmur, no rubs  Lungs: unlabored respirations, CTA b/l, no wheeze, no rales, no crackles  Abdomen: Soft/NT/ND; positive bowel sounds x 4  Sternum: Intact, no click, incision healing well with no drainage  Incisions: Incisions clean/dry/intact  Extremities: No edema b/l lower extremities; good capillary refill; no cyanosis; palpable 1+ pedal pulses b/l    Central Venous Catheter: Yes[x]  No[] , If Yes indication:     unstable      Day #1  Ramirez Catheter: Yes  [x] , No  [] , If yes indication:  unstable                    Day #1  NGT: Yes [] No [x] ,    If Yes Placement:                                     Day #  EPICARDIAL WIRES:  [x] YES [] NO                                              Day #1  BOWEL MOVEMENT:  [] YES [x] NO, If No, Timing since last BM:      Day #  CHEST TUBE(Left/Right):  [x] YES [] NO, If yes -  AIR LEAKS:  [] YES [x] NO        LABS:                        9.8<L>  10.80 )-----------( 141      ( 2018 02:03 )             28.6<L>                        11.0<L>  11.24<H> )-----------( 103<L>    ( 2018 15:20 )             30.9<L>    07-21    142  |  110  |  12  ----------------------------<  126<H>  4.2   |  21  |  1.4      143  |  108  |  11  ----------------------------<  128<H>  4.6   |  20  |  1.3    Ca    7.7<L>      2018 11:00  Mg     2.7         TPro  5.7<L> [6.0 - 8.0]  /  Alb  4.7 [3.5 - 5.2]  /  TBili  0.9 [0.2 - 1.2]  /  DBili  x   /  AST  24 [0 - 41]  /  ALT  11 [0 - 41]  /  AlkPhos  20<L> [30 - 115]      PT/INR - ( 2018 02:03 )   PT: ;   INR: 1.39 ratio         PT/INR - ( 2018 15:20 )   PT: ;   INR: 1.53 ratio         PTT - ( 2018 02:03 )  PTT:29.2 sec, PTT - ( 2018 15:20 )  PTT:25.0 sec    ABG - ( 2018 04:00 )  pH: 7.36  /  pCO2: 38    /  pO2: 71    / HCO3: 22    / Base Excess: -3.3  /  SaO2: 97    /  LA: 0.9        RADIOLOGY & ADDITIONAL TESTS:  CXR:   EK Lead ECG:   Systolic  mmHg    Diastolic BP 57 mmHg    Ventricular Rate 64 BPM    Atrial Rate 64 BPM    P-R Interval 158 ms    QRS Duration 100 ms    Q-T Interval 404 ms    QTC Calculation(Bezet) 417 ms    P Axis 51 degrees    R Axis 36 degrees    T Axis 93 degrees    Diagnosis Line Normal sinus rhythm  Early transition  Incomplete right bundle branch block  T wave abnormality, consider lateral ischemia  Abnormal ECG    Confirmed by TANESHA CARRASQUILLO, DONA ([id number]) on 2018 9:11:57 AM (18 @ 05:41)    MEDICATIONS  (STANDING):  ALBUTerol/ipratropium for Nebulization 3 milliLiter(s) Nebulizer every 6 hours  amiodarone Infusion 0.5 mG/Min (16.667 mL/Hr) IV Continuous <Continuous>  aspirin enteric coated 325 milliGRAM(s) Oral daily  atorvastatin 40 milliGRAM(s) Oral at bedtime  colchicine 0.6 milliGRAM(s) Oral every 12 hours  dextrose 50% Injectable 50 milliLiter(s) IV Push every 15 minutes  dextrose 50% Injectable 25 milliLiter(s) IV Push every 15 minutes  docusate sodium 100 milliGRAM(s) Oral three times a day  guaiFENesin  milliGRAM(s) Oral every 12 hours  HYDROmorphone  Injectable 0.5 milliGRAM(s) IV Push once  insulin Infusion 1 Unit(s)/Hr (1 mL/Hr) IV Continuous <Continuous>  ipratropium 17 MICROgram(s) HFA Inhaler 2 Puff(s) Inhalation every 6 hours  magnesium sulfate  IVPB 1 Gram(s) IV Intermittent every 12 hours  norepinephrine Infusion 0.05 MICROgram(s)/kG/Min (8.916 mL/Hr) IV Continuous <Continuous>  pantoprazole    Tablet 40 milliGRAM(s) Oral before breakfast  sodium chloride 0.9%. 1000 milliLiter(s) (50 mL/Hr) IV Continuous <Continuous>    MEDICATIONS  (PRN):  fentaNYL    Injectable 25 MICROGram(s) IV Push every 1 hour PRN Severe Pain (7 - 10)  ondansetron Injectable 4 milliGRAM(s) IV Push every 4 hours PRN Nausea and/or Vomiting  oxyCODONE    IR 10 milliGRAM(s) Oral every 4 hours PRN Severe Pain (7 - 10)  oxyCODONE    IR 5 milliGRAM(s) Oral every 4 hours PRN Moderate Pain (4 - 6)  senna 1 Tablet(s) Oral every 12 hours PRN Constipation    HEPARIN:  [] YES [x] NO  Dose: XX UNITS/HR UNITS Q8H  LOVENOX:[] YES [x] NO  Dose: XX mg Q24H  COUMADIN: []  YES [x] NO  Dose: XX mg  Q24H  SCD's: YES b/l  GI Prophylaxis: Protonix [x], Pepcid [], None [], (Contra-indication:.....)  No pharm DVT Proph at this time as per Dr. Gautam, Pt high risk for bleeding    Post-Op Aspirin: Yes [],  No [], If No, then contra-indication:  Post-Op Statin: Yes [], No[], If No, then contra-indication:  Post-Op Beta-Blockers: Yes [], No [], If No, then contra-indication:    Allergies:  No Known Allergies      Ambulation/Activity Status: Ambulated 75 ft with assistance and cardiac walker.  unable to go further due to pain    Assessment/Plan:  62y Male status-post CABG  - Case and plan discussed with CTU Intensivist and CT Surgeon - Dr. Gautam/Murali/Nicki   - Continue CTU supportive care    - Continue DVT/GI prophylaxis  - Incentive Spirometry 10 times an hour  - Continue to advance physical activity as tolerated and continue PT/OT as directed  1. CAD: Continue ASA, statin, no BB at this time pt is hypotensive requiring pressors  2. D/C swan cat, keep ramirez a-line, and chest tubes for further monitoring  3. OOB to ambulate  4. fentanyl, oxycodone, tylenol for pain managment.  D/C torodol  5. NS at 50 cc/hr  6. lasix 40 IV x 1 dose

## 2018-07-22 LAB
ANION GAP SERPL CALC-SCNC: 12 MMOL/L — SIGNIFICANT CHANGE UP (ref 7–14)
BASOPHILS # BLD AUTO: 0.03 K/UL — SIGNIFICANT CHANGE UP (ref 0–0.2)
BASOPHILS NFR BLD AUTO: 0.2 % — SIGNIFICANT CHANGE UP (ref 0–1)
BUN SERPL-MCNC: 13 MG/DL — SIGNIFICANT CHANGE UP (ref 10–20)
CALCIUM SERPL-MCNC: 7.6 MG/DL — LOW (ref 8.5–10.1)
CHLORIDE SERPL-SCNC: 104 MMOL/L — SIGNIFICANT CHANGE UP (ref 98–110)
CO2 SERPL-SCNC: 22 MMOL/L — SIGNIFICANT CHANGE UP (ref 17–32)
CREAT SERPL-MCNC: 1.3 MG/DL — SIGNIFICANT CHANGE UP (ref 0.7–1.5)
EOSINOPHIL # BLD AUTO: 0.02 K/UL — SIGNIFICANT CHANGE UP (ref 0–0.7)
EOSINOPHIL NFR BLD AUTO: 0.2 % — SIGNIFICANT CHANGE UP (ref 0–8)
GLUCOSE SERPL-MCNC: 141 MG/DL — HIGH (ref 70–99)
HCT VFR BLD CALC: 29.5 % — LOW (ref 42–52)
HGB BLD-MCNC: 10.3 G/DL — LOW (ref 14–18)
IMM GRANULOCYTES NFR BLD AUTO: 0.6 % — HIGH (ref 0.1–0.3)
LYMPHOCYTES # BLD AUTO: 1.27 K/UL — SIGNIFICANT CHANGE UP (ref 1.2–3.4)
LYMPHOCYTES # BLD AUTO: 9.6 % — LOW (ref 20.5–51.1)
MAGNESIUM SERPL-MCNC: 2.9 MG/DL — HIGH (ref 1.8–2.4)
MCHC RBC-ENTMCNC: 32.7 PG — HIGH (ref 27–31)
MCHC RBC-ENTMCNC: 34.9 G/DL — SIGNIFICANT CHANGE UP (ref 32–37)
MCV RBC AUTO: 93.7 FL — SIGNIFICANT CHANGE UP (ref 80–94)
MONOCYTES # BLD AUTO: 1.42 K/UL — HIGH (ref 0.1–0.6)
MONOCYTES NFR BLD AUTO: 10.8 % — HIGH (ref 1.7–9.3)
NEUTROPHILS # BLD AUTO: 10.35 K/UL — HIGH (ref 1.4–6.5)
NEUTROPHILS NFR BLD AUTO: 78.6 % — HIGH (ref 42.2–75.2)
NRBC # BLD: 0 /100 WBCS — SIGNIFICANT CHANGE UP (ref 0–0)
PLATELET # BLD AUTO: 142 K/UL — SIGNIFICANT CHANGE UP (ref 130–400)
POTASSIUM SERPL-MCNC: 4 MMOL/L — SIGNIFICANT CHANGE UP (ref 3.5–5)
POTASSIUM SERPL-SCNC: 4 MMOL/L — SIGNIFICANT CHANGE UP (ref 3.5–5)
RBC # BLD: 3.15 M/UL — LOW (ref 4.7–6.1)
RBC # FLD: 13.7 % — SIGNIFICANT CHANGE UP (ref 11.5–14.5)
SODIUM SERPL-SCNC: 138 MMOL/L — SIGNIFICANT CHANGE UP (ref 135–146)
WBC # BLD: 13.17 K/UL — HIGH (ref 4.8–10.8)
WBC # FLD AUTO: 13.17 K/UL — HIGH (ref 4.8–10.8)

## 2018-07-22 RX ORDER — ACETAMINOPHEN 500 MG
650 TABLET ORAL ONCE
Qty: 0 | Refills: 0 | Status: COMPLETED | OUTPATIENT
Start: 2018-07-22 | End: 2018-07-22

## 2018-07-22 RX ORDER — SODIUM CHLORIDE 9 MG/ML
1000 INJECTION INTRAMUSCULAR; INTRAVENOUS; SUBCUTANEOUS
Qty: 0 | Refills: 0 | Status: DISCONTINUED | OUTPATIENT
Start: 2018-07-22 | End: 2018-07-24

## 2018-07-22 RX ADMIN — OXYCODONE HYDROCHLORIDE 10 MILLIGRAM(S): 5 TABLET ORAL at 09:15

## 2018-07-22 RX ADMIN — Medication 100 GRAM(S): at 06:33

## 2018-07-22 RX ADMIN — SENNA PLUS 1 TABLET(S): 8.6 TABLET ORAL at 12:39

## 2018-07-22 RX ADMIN — Medication 600 MILLIGRAM(S): at 06:33

## 2018-07-22 RX ADMIN — Medication 100 MILLIGRAM(S): at 06:33

## 2018-07-22 RX ADMIN — OXYCODONE HYDROCHLORIDE 10 MILLIGRAM(S): 5 TABLET ORAL at 13:18

## 2018-07-22 RX ADMIN — OXYCODONE HYDROCHLORIDE 10 MILLIGRAM(S): 5 TABLET ORAL at 08:45

## 2018-07-22 RX ADMIN — INSULIN HUMAN 1 UNIT(S)/HR: 100 INJECTION, SOLUTION SUBCUTANEOUS at 04:44

## 2018-07-22 RX ADMIN — OXYCODONE HYDROCHLORIDE 10 MILLIGRAM(S): 5 TABLET ORAL at 17:39

## 2018-07-22 RX ADMIN — FENTANYL CITRATE 25 MICROGRAM(S): 50 INJECTION INTRAVENOUS at 02:56

## 2018-07-22 RX ADMIN — Medication 3 MILLILITER(S): at 20:29

## 2018-07-22 RX ADMIN — PANTOPRAZOLE SODIUM 40 MILLIGRAM(S): 20 TABLET, DELAYED RELEASE ORAL at 06:33

## 2018-07-22 RX ADMIN — SODIUM CHLORIDE 50 MILLILITER(S): 9 INJECTION INTRAMUSCULAR; INTRAVENOUS; SUBCUTANEOUS at 02:42

## 2018-07-22 RX ADMIN — Medication 100 MILLIGRAM(S): at 13:18

## 2018-07-22 RX ADMIN — Medication 600 MILLIGRAM(S): at 17:38

## 2018-07-22 RX ADMIN — Medication 100 MILLIGRAM(S): at 21:00

## 2018-07-22 RX ADMIN — FENTANYL CITRATE 25 MICROGRAM(S): 50 INJECTION INTRAVENOUS at 03:15

## 2018-07-22 RX ADMIN — ATORVASTATIN CALCIUM 40 MILLIGRAM(S): 80 TABLET, FILM COATED ORAL at 21:00

## 2018-07-22 RX ADMIN — AMIODARONE HYDROCHLORIDE 16.67 MG/MIN: 400 TABLET ORAL at 04:44

## 2018-07-22 RX ADMIN — Medication 3 MILLILITER(S): at 09:28

## 2018-07-22 RX ADMIN — Medication 325 MILLIGRAM(S): at 12:39

## 2018-07-22 RX ADMIN — Medication 650 MILLIGRAM(S): at 07:30

## 2018-07-22 RX ADMIN — Medication 100 GRAM(S): at 17:38

## 2018-07-22 RX ADMIN — OXYCODONE HYDROCHLORIDE 10 MILLIGRAM(S): 5 TABLET ORAL at 13:48

## 2018-07-22 NOTE — PROGRESS NOTE ADULT - SUBJECTIVE AND OBJECTIVE BOX
RONALD MAHER  MRN#: [id number]  Subjective:  Patient was seen and evalauted on AM rounds-incisional/post-op pain    OBJECTIVE:  ICU Vital Signs Last 24 Hrs  T(C): 37.6 (22 Jul 2018 10:15), Max: 37.9 (21 Jul 2018 18:00)  T(F): 99.6 (22 Jul 2018 10:15), Max: 100.3 (22 Jul 2018 03:15)  HR: 70 (22 Jul 2018 11:15) (60 - 76)  BP: 119/68 (22 Jul 2018 10:45) (100/55 - 140/65)  BP(mean): 86 (22 Jul 2018 10:45) (67 - 107)  ABP: 122/46 (21 Jul 2018 18:15) (102/46 - 152/50)  ABP(mean): 68 (21 Jul 2018 18:15) (64 - 80)  RR: 18 (22 Jul 2018 06:45) (15 - 22)  SpO2: 97% (22 Jul 2018 11:15) (96% - 100%)      07-21 @ 07:01 - 07-22 @ 07:00  --------------------------------------------------------  IN: 3379.1 mL / OUT: 2629 mL / NET: 750.1 mL    07-22 @ 07:01  - 07-22 @ 11:26  --------------------------------------------------------  IN: 459.7 mL / OUT: 187 mL / NET: 272.7 mL      CAPILLARY BLOOD GLUCOSE  131 (22 Jul 2018 08:15)          PHYSICAL EXAM:Daily     Daily   General: WN/WD NAD    HEENT:     + NCAT  + EOMI  - Conjuctival edema   - Icterus   - Thrush   - ETT  - NGT/OGT    Neck:         + FROM    - JVD     - Nodes     - Masses    + Mid-line trachea   - Tracheostomy    Chest:         - Sternal click  - Sternal drainage  + Pacing wires  + Chest tubes  - SubQ emphysema    Lungs:          + CTA   - Rhonchi    - Rales    - Wheezing     - Decreased BS   - Dullness R L    Cardiac:       + S1 + S2    + RRR   - Irregular   - S3  - S4    - Murmurs   - Rub   - Hamman’s sign     Abdomen:    + BS     + Soft    + Non-tender     - Distended    - Organomegaly  - PEG    Extremities:   - Cyanosis U/L   - Clubbing  U/L  - LE/UE Edema   + Capillary refill    + Pulses     Neuro:        + Awake   +  Alert   - Confused   - Lethargic   - Sedated   - Generalized Weakness    Skin:        - Rashes    - Erythema   + Normal incisions   + IV sites intact  - Sacral decubitus    HOSPITAL MEDICATIONS:  MEDICATIONS  (STANDING):  ALBUTerol/ipratropium for Nebulization 3 milliLiter(s) Nebulizer every 6 hours  amiodarone Infusion 0.5 mG/Min (16.667 mL/Hr) IV Continuous <Continuous>  aspirin enteric coated 325 milliGRAM(s) Oral daily  atorvastatin 40 milliGRAM(s) Oral at bedtime  dextrose 50% Injectable 50 milliLiter(s) IV Push every 15 minutes  dextrose 50% Injectable 25 milliLiter(s) IV Push every 15 minutes  docusate sodium 100 milliGRAM(s) Oral three times a day  guaiFENesin  milliGRAM(s) Oral every 12 hours  insulin Infusion 1 Unit(s)/Hr (1 mL/Hr) IV Continuous <Continuous>  magnesium sulfate  IVPB 1 Gram(s) IV Intermittent every 12 hours  norepinephrine Infusion 0.05 MICROgram(s)/kG/Min (8.916 mL/Hr) IV Continuous <Continuous>  pantoprazole    Tablet 40 milliGRAM(s) Oral before breakfast  sodium chloride 0.9%. 1000 milliLiter(s) (10 mL/Hr) IV Continuous <Continuous>    MEDICATIONS  (PRN):  acetaminophen   Tablet. 650 milliGRAM(s) Oral every 6 hours PRN Moderate Pain (4 - 6)  fentaNYL    Injectable 25 MICROGram(s) IV Push every 1 hour PRN Severe Pain (7 - 10)  oxyCODONE    IR 10 milliGRAM(s) Oral every 4 hours PRN Severe Pain (7 - 10)  oxyCODONE    IR 5 milliGRAM(s) Oral every 4 hours PRN Moderate Pain (4 - 6)  senna 1 Tablet(s) Oral every 12 hours PRN Constipation      LABS:                        10.3   13.17 )-----------( 142      ( 22 Jul 2018 02:00 )             29.5    07-22    138  |  104  |  13  ----------------------------<  141<H>  4.0   |  22  |  1.3    Ca    7.6<L>      22 Jul 2018 02:00  Mg     2.9     07-22    TPro  5.8<L>  /  Alb  4.4  /  TBili  0.8  /  DBili  x   /  AST  30  /  ALT  11  /  AlkPhos  23<L>  07-21    PT/INR - ( 21 Jul 2018 02:03 )   PT: 15.00 sec;   INR: 1.39 ratio         PTT - ( 21 Jul 2018 02:03 )  PTT:29.2 sec LIVER FUNCTIONS - ( 21 Jul 2018 21:30 )  Alb: 4.4 g/dL / Pro: 5.8 g/dL / ALK PHOS: 23 U/L / ALT: 11 U/L / AST: 30 U/L / GGT: x               RADIOLOGY:  X Reviewed and interpreted by me: small bilat effusions    CARDIOPULMONARY DYSFUNCTION  - Respiratory status required supplemental oxygen & the following of continuous pulse oximetry for support & to prevent decompensation  - Continued early mobilization as tolerated  - Addressed analgesic regimen to optimize function    PREVENTION-PROPHYLAXIS  - ASA continued for graft occlusion-thromboembolism prophylaxis  - Lipitor/Zocor was also started for long term graft patency  - Lovenox/Heparin initiated/continued for VTE prophylaxis in addition to Venodyne boots  - Protonix/Pepcid maintained for GI bleeding prophylaxis  - Lopressor initiated/continued for atrial fibrillation prophylaxis  - Metabolic stability & infection prophylaxis required review and adjustment of regular Insulin sliding scale and gylcemic regimen while following serial glucose levels to help achieve & maintain euglycemia  - Reviewed & addressed surgical site infection prophylaxis regimen

## 2018-07-22 NOTE — PROGRESS NOTE ADULT - SUBJECTIVE AND OBJECTIVE BOX
OPERATIVE PROCEDURE(s):                POD #                       62yMale  SURGEON(s): RIN Gautam  SUBJECTIVE ASSESSMENT:  Patient has no complaints at this time.    Vital Signs Last 24 Hrs  T(F): 100.3 (22 Jul 2018 07:15), Max: 100.3 (22 Jul 2018 03:15)  HR: 70 (22 Jul 2018 07:45) (60 - 74)  BP: 133/61 (22 Jul 2018 07:45) (100/55 - 140/65)  BP(mean): 97 (22 Jul 2018 07:45) (67 - 105)  ABP: 122/46 (21 Jul 2018 18:15) (102/46 - 152/50)  ABP(mean): 68 (21 Jul 2018 18:15)  RR: 18 (22 Jul 2018 06:45) (15 - 29)  SpO2: 100% (22 Jul 2018 07:45) (96% - 100%)  CVP(mm Hg): 16 (21 Jul 2018 10:30)  CVP(cm H2O): --  CO: 7.2 (21 Jul 2018 09:15)  CI: 3.5 (21 Jul 2018 09:15)  PA: 40/16 (21 Jul 2018 10:30)  SVR: 588 (21 Jul 2018 09:15)    I&O's Detail    21 Jul 2018 07:01  -  22 Jul 2018 07:00  --------------------------------------------------------  IN:    Albumin 5%  - 500 mL: 500 mL    amiodarone Infusion: 400.1 mL    insulin Infusion: 67 mL    IV PiggyBack: 200 mL    norepinephrine Infusion: 142 mL    Oral Fluid: 900 mL    sodium chloride 0.9%: 20 mL    sodium chloride 0.9%.: 1150 mL  Total IN: 3379.1 mL    OUT:    Chest Tube: 170 mL    Chest Tube: 140 mL    Indwelling Catheter - Urethral: 2319 mL  Total OUT: 2629 mL        Net:   I&O's Detail    20 Jul 2018 07:01  -  21 Jul 2018 07:00  --------------------------------------------------------  Total NET: 1037.1 mL      21 Jul 2018 07:01  -  22 Jul 2018 07:00  --------------------------------------------------------  Total NET: 750.1 mL        CAPILLARY BLOOD GLUCOSE  121 (22 Jul 2018 06:11)  149 (22 Jul 2018 02:45)  101 (21 Jul 2018 21:41)  114 (21 Jul 2018 18:00)  118 (21 Jul 2018 16:00)  130 (21 Jul 2018 14:00)  141 (21 Jul 2018 12:00)  134 (21 Jul 2018 08:00)  136 (21 Jul 2018 06:33)  128 (21 Jul 2018 04:00)        Physical Exam:  General: NAD; A&Ox3/Patient is intubated and sedated  Cardiac: S1/S2, RRR, no murmur, no rubs  Lungs: unlabored respirations, CTA b/l, no wheeze, no rales, no crackles  Abdomen: Soft/NT/ND; positive bowel sounds x 4  Sternum: Intact, no click, incision healing well with no drainage  Incisions: Incisions clean/dry/intact  Extremities: No edema b/l lower extremities; good capillary refill; no cyanosis; palpable 1+ pedal pulses b/l    Central Venous Catheter: Yes[]  No[] , If Yes indication:           Day #  Santos Catheter: Yes  [] , No  [] , If yes indication:                      Day #  NGT: Yes [] No [] ,    If Yes Placement:                                     Day #  EPICARDIAL WIRES:  [] YES [] NO                                              Day #  BOWEL MOVEMENT:  [] YES [] NO, If No, Timing since last BM:      Day #  CHEST TUBE(Left/Right):  [] YES [] NO, If yes -  AIR LEAKS:  [] YES [] NO        LABS:                        10.3<L>  13.17<H> )-----------( 142      ( 22 Jul 2018 02:00 )             29.5<L>                        9.7<L>  9.29  )-----------( 103<L>    ( 21 Jul 2018 21:30 )             27.9<L>    07-22    138  |  104  |  13  ----------------------------<  141<H>  4.0   |  22  |  1.3  07-21    141  |  105  |  13  ----------------------------<  95  4.0   |  23  |  1.4    Ca    7.6<L>      22 Jul 2018 02:00  Mg     2.9     07-22    TPro  5.8<L> [6.0 - 8.0]  /  Alb  4.4 [3.5 - 5.2]  /  TBili  0.8 [0.2 - 1.2]  /  DBili  x   /  AST  30 [0 - 41]  /  ALT  11 [0 - 41]  /  AlkPhos  23<L> [30 - 115]  07-21    PT/INR - ( 21 Jul 2018 02:03 )   PT: ;   INR: 1.39 ratio         PT/INR - ( 20 Jul 2018 15:20 )   PT: ;   INR: 1.53 ratio         PTT - ( 21 Jul 2018 02:03 )  PTT:29.2 sec, PTT - ( 20 Jul 2018 15:20 )  PTT:25.0 sec    ABG - ( 21 Jul 2018 04:00 )  pH: 7.36  /  pCO2: 38    /  pO2: 71    / HCO3: 22    / Base Excess: -3.3  /  SaO2: 97    /  LA: 0.9              RADIOLOGY & ADDITIONAL TESTS:  CXR:   EKG:  MEDICATIONS  (STANDING):  acetaminophen   Tablet 650 milliGRAM(s) Oral once  ALBUTerol/ipratropium for Nebulization 3 milliLiter(s) Nebulizer every 6 hours  amiodarone Infusion 0.5 mG/Min (16.667 mL/Hr) IV Continuous <Continuous>  aspirin enteric coated 325 milliGRAM(s) Oral daily  atorvastatin 40 milliGRAM(s) Oral at bedtime  dextrose 50% Injectable 50 milliLiter(s) IV Push every 15 minutes  dextrose 50% Injectable 25 milliLiter(s) IV Push every 15 minutes  docusate sodium 100 milliGRAM(s) Oral three times a day  guaiFENesin  milliGRAM(s) Oral every 12 hours  insulin Infusion 1 Unit(s)/Hr (1 mL/Hr) IV Continuous <Continuous>  ipratropium 17 MICROgram(s) HFA Inhaler 2 Puff(s) Inhalation every 6 hours  magnesium sulfate  IVPB 1 Gram(s) IV Intermittent every 12 hours  norepinephrine Infusion 0.05 MICROgram(s)/kG/Min (8.916 mL/Hr) IV Continuous <Continuous>  pantoprazole    Tablet 40 milliGRAM(s) Oral before breakfast  sodium chloride 0.9%. 1000 milliLiter(s) (50 mL/Hr) IV Continuous <Continuous>    MEDICATIONS  (PRN):  acetaminophen   Tablet. 650 milliGRAM(s) Oral every 6 hours PRN Moderate Pain (4 - 6)  fentaNYL    Injectable 25 MICROGram(s) IV Push every 1 hour PRN Severe Pain (7 - 10)  oxyCODONE    IR 10 milliGRAM(s) Oral every 4 hours PRN Severe Pain (7 - 10)  oxyCODONE    IR 5 milliGRAM(s) Oral every 4 hours PRN Moderate Pain (4 - 6)  senna 1 Tablet(s) Oral every 12 hours PRN Constipation    HEPARIN:  [] YES [] NO  Dose: XX UNITS/HR UNITS Q8H  LOVENOX:[] YES [] NO  Dose: XX mg Q24H  COUMADIN: []  YES [] NO  Dose: XX mg  Q24H  SCD's: YES b/l  GI Prophylaxis: Protonix [], Pepcid [], None [], (Contra-indication:.....)    Post-Op Aspirin: Yes [],  No [], If No, then contra-indication:  Post-Op Statin: Yes [], No[], If No, then contra-indication:  Post-Op Beta-Blockers: Yes [], No [], If No, then contra-indication:    Allergies:  No Known Allergies      Ambulation/Activity Status: Ambulates several times daily without assistance.    Assessment/Plan:  62y Male status-post .....  - Case and plan discussed with CTU Intensivist and CT Surgeon - Dr. Gautam/Murali/Nicki   - Continue CTU supportive care    - Continue DVT/GI prophylaxis  - Incentive Spirometry 10 times an hour  - Continue to advance physical activity as tolerated and continue PT/OT as directed  1. CAD: Continue ASA, statin, BB  2. HTN:   3. A. Fib:   4. COPD/Hypoxia:   5. DM/Glucose Control:     Social Service Disposition: OPERATIVE PROCEDURE(s):     CABG  x 2         POD # 2                      62yMale  SURGEON(s): RIN Gautam  SUBJECTIVE ASSESSMENT:  Patient has no complaints at this time.    Vital Signs Last 24 Hrs  T(F): 100.3 (22 Jul 2018 07:15), Max: 100.3 (22 Jul 2018 03:15)  HR: 70 (22 Jul 2018 07:45) (60 - 74)  BP: 133/61 (22 Jul 2018 07:45) (100/55 - 140/65)  BP(mean): 97 (22 Jul 2018 07:45) (67 - 105)  ABP: 122/46 (21 Jul 2018 18:15) (102/46 - 152/50)  ABP(mean): 68 (21 Jul 2018 18:15)  RR: 18 (22 Jul 2018 06:45) (15 - 29)  SpO2: 100% (22 Jul 2018 07:45) (96% - 100%)  CVP(mm Hg): 16 (21 Jul 2018 10:30)  CVP(cm H2O): --  CO: 7.2 (21 Jul 2018 09:15)  CI: 3.5 (21 Jul 2018 09:15)  PA: 40/16 (21 Jul 2018 10:30)  SVR: 588 (21 Jul 2018 09:15)    I&O's Detail    21 Jul 2018 07:01  -  22 Jul 2018 07:00  --------------------------------------------------------  IN:    Albumin 5%  - 500 mL: 500 mL    amiodarone Infusion: 400.1 mL    insulin Infusion: 67 mL    IV PiggyBack: 200 mL    norepinephrine Infusion: 142 mL    Oral Fluid: 900 mL    sodium chloride 0.9%: 20 mL    sodium chloride 0.9%.: 1150 mL  Total IN: 3379.1 mL    OUT:    Chest Tube: 170 mL    Chest Tube: 140 mL    Indwelling Catheter - Urethral: 2319 mL  Total OUT: 2629 mL        Net:   I&O's Detail    20 Jul 2018 07:01  -  21 Jul 2018 07:00  --------------------------------------------------------  Total NET: 1037.1 mL      21 Jul 2018 07:01  -  22 Jul 2018 07:00  --------------------------------------------------------  Total NET: 750.1 mL        CAPILLARY BLOOD GLUCOSE  121 (22 Jul 2018 06:11)  149 (22 Jul 2018 02:45)  101 (21 Jul 2018 21:41)  114 (21 Jul 2018 18:00)  118 (21 Jul 2018 16:00)  130 (21 Jul 2018 14:00)  141 (21 Jul 2018 12:00)  134 (21 Jul 2018 08:00)  136 (21 Jul 2018 06:33)  128 (21 Jul 2018 04:00)        Physical Exam:  General: NAD; A&Ox3  Cardiac: S1/S2, RRR, no murmur, no rubs  Lungs: unlabored respirations, CTA b/l, no wheeze, no rales, no crackles  Abdomen: Soft/NT/ND; positive bowel sounds x 4  Sternum: Intact, no click, incision healing well with no drainage  Incisions: Incisions clean/dry/intact  Extremities: Mild edema b/l lower extremities; good capillary refill; no cyanosis; palpable 1+ pedal pulses b/l    Central Venous Catheter: Yes[x]  No[] , If Yes indication:     unstable      Day #2  Santos Catheter: Yes  [] , No  [x] , If yes indication:                      Day #  NGT: Yes [] No [x] ,    If Yes Placement:                                     Day #  EPICARDIAL WIRES:  [x] YES [] NO                                              Day #2  BOWEL MOVEMENT:  [] YES [x] NO, If No, Timing since last BM:      Day #2  CHEST TUBE(Left/Right):  [] YES [x] NO, If yes -  AIR LEAKS:  [] YES [] NO        LABS:                        10.3<L>  13.17<H> )-----------( 142      ( 22 Jul 2018 02:00 )             29.5<L>                        9.7<L>  9.29  )-----------( 103<L>    ( 21 Jul 2018 21:30 )             27.9<L>    07-22    138  |  104  |  13  ----------------------------<  141<H>  4.0   |  22  |  1.3  07-21    141  |  105  |  13  ----------------------------<  95  4.0   |  23  |  1.4    Ca    7.6<L>      22 Jul 2018 02:00  Mg     2.9     07-22    TPro  5.8<L> [6.0 - 8.0]  /  Alb  4.4 [3.5 - 5.2]  /  TBili  0.8 [0.2 - 1.2]  /  DBili  x   /  AST  30 [0 - 41]  /  ALT  11 [0 - 41]  /  AlkPhos  23<L> [30 - 115]  07-21    PT/INR - ( 21 Jul 2018 02:03 )   PT: ;   INR: 1.39 ratio         PT/INR - ( 20 Jul 2018 15:20 )   PT: ;   INR: 1.53 ratio         PTT - ( 21 Jul 2018 02:03 )  PTT:29.2 sec, PTT - ( 20 Jul 2018 15:20 )  PTT:25.0 sec    ABG - ( 21 Jul 2018 04:00 )  pH: 7.36  /  pCO2: 38    /  pO2: 71    / HCO3: 22    / Base Excess: -3.3  /  SaO2: 97    /  LA: 0.9              RADIOLOGY & ADDITIONAL TESTS:  CXR: < from: Xray Chest 1 View- PORTABLE-Routine (07.22.18 @ 06:36) >    Impression:      Left lower lobe opacity/pleural effusion. No air leak.    < end of copied text >    EKG: < from: 12 Lead ECG (07.22.18 @ 05:15) >    Ventricular Rate 71 BPM    Atrial Rate 71 BPM    P-R Interval 183 ms    QRS Duration 96 ms    Q-T Interval 392 ms    QTC Calculation(Bezet) 426 ms    P Axis 54 degrees    R Axis 44 degrees    T Axis 85 degrees    Diagnosis Line Normal sinus rhythm  Incomplete right bundle branch block  Nonspecific ST and T wave abnormality  Abnormal ECG    Confirmed by DONA ALMENDAREZ MD ([id number]) on 7/22/2018 10:10:53 AM    < end of copied text >    MEDICATIONS  (STANDING):  acetaminophen   Tablet 650 milliGRAM(s) Oral once  ALBUTerol/ipratropium for Nebulization 3 milliLiter(s) Nebulizer every 6 hours  amiodarone Infusion 0.5 mG/Min (16.667 mL/Hr) IV Continuous <Continuous>  aspirin enteric coated 325 milliGRAM(s) Oral daily  atorvastatin 40 milliGRAM(s) Oral at bedtime  dextrose 50% Injectable 50 milliLiter(s) IV Push every 15 minutes  dextrose 50% Injectable 25 milliLiter(s) IV Push every 15 minutes  docusate sodium 100 milliGRAM(s) Oral three times a day  guaiFENesin  milliGRAM(s) Oral every 12 hours  insulin Infusion 1 Unit(s)/Hr (1 mL/Hr) IV Continuous <Continuous>  ipratropium 17 MICROgram(s) HFA Inhaler 2 Puff(s) Inhalation every 6 hours  magnesium sulfate  IVPB 1 Gram(s) IV Intermittent every 12 hours  norepinephrine Infusion 0.05 MICROgram(s)/kG/Min (8.916 mL/Hr) IV Continuous <Continuous>  pantoprazole    Tablet 40 milliGRAM(s) Oral before breakfast  sodium chloride 0.9%. 1000 milliLiter(s) (50 mL/Hr) IV Continuous <Continuous>    MEDICATIONS  (PRN):  acetaminophen   Tablet. 650 milliGRAM(s) Oral every 6 hours PRN Moderate Pain (4 - 6)  fentaNYL    Injectable 25 MICROGram(s) IV Push every 1 hour PRN Severe Pain (7 - 10)  oxyCODONE    IR 10 milliGRAM(s) Oral every 4 hours PRN Severe Pain (7 - 10)  oxyCODONE    IR 5 milliGRAM(s) Oral every 4 hours PRN Moderate Pain (4 - 6)  senna 1 Tablet(s) Oral every 12 hours PRN Constipation    HEPARIN:  [] YES [x] NO  Dose: XX UNITS/HR UNITS Q8H  LOVENOX:[] YES [x] NO  Dose: XX mg Q24H  COUMADIN: []  YES [x] NO  Dose: XX mg  Q24H  No Pharmacologic DVT prophylaxis at this time as per Dr. Gautam, high risk for Bleed.  SCD's: YES b/l  GI Prophylaxis: Protonix [x], Pepcid [], None [], (Contra-indication:.....)    Post-Op Aspirin: Yes [x],  No [], If No, then contra-indication:  Post-Op Statin: Yes [x], No[], If No, then contra-indication:  Post-Op Beta-Blockers: Yes [], No [x], If No, then contra-indication:  hypotensive on pressors    Allergies:  No Known Allergies      Ambulation/Activity Status: Ambulates several times daily with assistance.    Assessment/Plan:  62y Male status-post CABG  - Case and plan discussed with CTU Intensivist and CT Surgeon - Dr. Gautam/Murali/Nicki   - Continue CTU supportive care    - Continue DVT/GI prophylaxis  - Incentive Spirometry 10 times an hour  - Continue to advance physical activity as tolerated and continue PT/OT as directed  1. CAD: Continue ASA, statin, Hold BB, will start BB when BP tolerates  2. wean pressores, d/c ashley

## 2018-07-22 NOTE — PHYSICAL THERAPY INITIAL EVALUATION ADULT - GENERAL OBSERVATIONS, REHAB EVAL
11:30-12:12 42 min   pt rec OOB in chair in NAD, all lines intact, VSS, pt agreeable to PT, pt c/o pain with coughing, RN Charisma is aware, pt had pain meds, pt also reports difficulty with taking a deep breath

## 2018-07-23 LAB
ANION GAP SERPL CALC-SCNC: 11 MMOL/L — SIGNIFICANT CHANGE UP (ref 7–14)
BASOPHILS # BLD AUTO: 0.02 K/UL — SIGNIFICANT CHANGE UP (ref 0–0.2)
BASOPHILS NFR BLD AUTO: 0.2 % — SIGNIFICANT CHANGE UP (ref 0–1)
BUN SERPL-MCNC: 15 MG/DL — SIGNIFICANT CHANGE UP (ref 10–20)
CALCIUM SERPL-MCNC: 7.9 MG/DL — LOW (ref 8.5–10.1)
CHLORIDE SERPL-SCNC: 102 MMOL/L — SIGNIFICANT CHANGE UP (ref 98–110)
CO2 SERPL-SCNC: 24 MMOL/L — SIGNIFICANT CHANGE UP (ref 17–32)
CREAT SERPL-MCNC: 1.1 MG/DL — SIGNIFICANT CHANGE UP (ref 0.7–1.5)
EOSINOPHIL # BLD AUTO: 0.1 K/UL — SIGNIFICANT CHANGE UP (ref 0–0.7)
EOSINOPHIL NFR BLD AUTO: 1 % — SIGNIFICANT CHANGE UP (ref 0–8)
GLUCOSE SERPL-MCNC: 134 MG/DL — HIGH (ref 70–99)
HCT VFR BLD CALC: 28.3 % — LOW (ref 42–52)
HGB BLD-MCNC: 9.9 G/DL — LOW (ref 14–18)
IMM GRANULOCYTES NFR BLD AUTO: 0.6 % — HIGH (ref 0.1–0.3)
LYMPHOCYTES # BLD AUTO: 1.32 K/UL — SIGNIFICANT CHANGE UP (ref 1.2–3.4)
LYMPHOCYTES # BLD AUTO: 13.4 % — LOW (ref 20.5–51.1)
MAGNESIUM SERPL-MCNC: 2.9 MG/DL — HIGH (ref 1.8–2.4)
MCHC RBC-ENTMCNC: 32.7 PG — HIGH (ref 27–31)
MCHC RBC-ENTMCNC: 35 G/DL — SIGNIFICANT CHANGE UP (ref 32–37)
MCV RBC AUTO: 93.4 FL — SIGNIFICANT CHANGE UP (ref 80–94)
MONOCYTES # BLD AUTO: 1.05 K/UL — HIGH (ref 0.1–0.6)
MONOCYTES NFR BLD AUTO: 10.7 % — HIGH (ref 1.7–9.3)
NEUTROPHILS # BLD AUTO: 7.29 K/UL — HIGH (ref 1.4–6.5)
NEUTROPHILS NFR BLD AUTO: 74.1 % — SIGNIFICANT CHANGE UP (ref 42.2–75.2)
NRBC # BLD: 0 /100 WBCS — SIGNIFICANT CHANGE UP (ref 0–0)
PLATELET # BLD AUTO: 128 K/UL — LOW (ref 130–400)
POTASSIUM SERPL-MCNC: 4.3 MMOL/L — SIGNIFICANT CHANGE UP (ref 3.5–5)
POTASSIUM SERPL-SCNC: 4.3 MMOL/L — SIGNIFICANT CHANGE UP (ref 3.5–5)
RBC # BLD: 3.03 M/UL — LOW (ref 4.7–6.1)
RBC # FLD: 13.9 % — SIGNIFICANT CHANGE UP (ref 11.5–14.5)
SODIUM SERPL-SCNC: 137 MMOL/L — SIGNIFICANT CHANGE UP (ref 135–146)
WBC # BLD: 9.84 K/UL — SIGNIFICANT CHANGE UP (ref 4.8–10.8)
WBC # FLD AUTO: 9.84 K/UL — SIGNIFICANT CHANGE UP (ref 4.8–10.8)

## 2018-07-23 RX ORDER — AMIODARONE HYDROCHLORIDE 400 MG/1
200 TABLET ORAL DAILY
Qty: 0 | Refills: 0 | Status: DISCONTINUED | OUTPATIENT
Start: 2018-07-23 | End: 2018-07-24

## 2018-07-23 RX ORDER — SODIUM CHLORIDE 9 MG/ML
1000 INJECTION, SOLUTION INTRAVENOUS
Qty: 0 | Refills: 0 | Status: DISCONTINUED | OUTPATIENT
Start: 2018-07-23 | End: 2018-07-24

## 2018-07-23 RX ORDER — FUROSEMIDE 40 MG
40 TABLET ORAL ONCE
Qty: 0 | Refills: 0 | Status: COMPLETED | OUTPATIENT
Start: 2018-07-23 | End: 2018-07-23

## 2018-07-23 RX ORDER — GLUCAGON INJECTION, SOLUTION 0.5 MG/.1ML
1 INJECTION, SOLUTION SUBCUTANEOUS ONCE
Qty: 0 | Refills: 0 | Status: DISCONTINUED | OUTPATIENT
Start: 2018-07-23 | End: 2018-07-24

## 2018-07-23 RX ORDER — METOPROLOL TARTRATE 50 MG
12.5 TABLET ORAL
Qty: 0 | Refills: 0 | Status: DISCONTINUED | OUTPATIENT
Start: 2018-07-23 | End: 2018-07-24

## 2018-07-23 RX ORDER — METFORMIN HYDROCHLORIDE 850 MG/1
1000 TABLET ORAL
Qty: 0 | Refills: 0 | Status: DISCONTINUED | OUTPATIENT
Start: 2018-07-23 | End: 2018-07-24

## 2018-07-23 RX ORDER — DEXTROSE 50 % IN WATER 50 %
15 SYRINGE (ML) INTRAVENOUS ONCE
Qty: 0 | Refills: 0 | Status: DISCONTINUED | OUTPATIENT
Start: 2018-07-23 | End: 2018-07-24

## 2018-07-23 RX ORDER — KETOROLAC TROMETHAMINE 30 MG/ML
15 SYRINGE (ML) INJECTION EVERY 4 HOURS
Qty: 0 | Refills: 0 | Status: DISCONTINUED | OUTPATIENT
Start: 2018-07-23 | End: 2018-07-24

## 2018-07-23 RX ORDER — INSULIN LISPRO 100/ML
VIAL (ML) SUBCUTANEOUS
Qty: 0 | Refills: 0 | Status: DISCONTINUED | OUTPATIENT
Start: 2018-07-23 | End: 2018-07-24

## 2018-07-23 RX ADMIN — Medication 600 MILLIGRAM(S): at 17:05

## 2018-07-23 RX ADMIN — Medication 100 MILLIGRAM(S): at 12:41

## 2018-07-23 RX ADMIN — Medication 100 MILLIGRAM(S): at 21:54

## 2018-07-23 RX ADMIN — Medication 100 GRAM(S): at 17:06

## 2018-07-23 RX ADMIN — SENNA PLUS 1 TABLET(S): 8.6 TABLET ORAL at 21:54

## 2018-07-23 RX ADMIN — AMIODARONE HYDROCHLORIDE 200 MILLIGRAM(S): 400 TABLET ORAL at 08:08

## 2018-07-23 RX ADMIN — Medication 100 MILLIGRAM(S): at 06:02

## 2018-07-23 RX ADMIN — Medication 3 MILLILITER(S): at 08:22

## 2018-07-23 RX ADMIN — METFORMIN HYDROCHLORIDE 1000 MILLIGRAM(S): 850 TABLET ORAL at 08:09

## 2018-07-23 RX ADMIN — Medication 15 MILLIGRAM(S): at 07:35

## 2018-07-23 RX ADMIN — METFORMIN HYDROCHLORIDE 1000 MILLIGRAM(S): 850 TABLET ORAL at 17:05

## 2018-07-23 RX ADMIN — Medication 3 MILLILITER(S): at 14:38

## 2018-07-23 RX ADMIN — Medication 15 MILLIGRAM(S): at 23:10

## 2018-07-23 RX ADMIN — Medication 600 MILLIGRAM(S): at 06:02

## 2018-07-23 RX ADMIN — Medication 3 MILLILITER(S): at 19:40

## 2018-07-23 RX ADMIN — PANTOPRAZOLE SODIUM 40 MILLIGRAM(S): 20 TABLET, DELAYED RELEASE ORAL at 06:02

## 2018-07-23 RX ADMIN — Medication 15 MILLIGRAM(S): at 22:40

## 2018-07-23 RX ADMIN — Medication 12.5 MILLIGRAM(S): at 17:05

## 2018-07-23 RX ADMIN — Medication 325 MILLIGRAM(S): at 12:41

## 2018-07-23 RX ADMIN — Medication 100 GRAM(S): at 07:11

## 2018-07-23 RX ADMIN — Medication 15 MILLIGRAM(S): at 12:41

## 2018-07-23 RX ADMIN — Medication 12.5 MILLIGRAM(S): at 08:08

## 2018-07-23 RX ADMIN — Medication 15 MILLIGRAM(S): at 08:33

## 2018-07-23 RX ADMIN — ATORVASTATIN CALCIUM 40 MILLIGRAM(S): 80 TABLET, FILM COATED ORAL at 21:54

## 2018-07-23 NOTE — OCCUPATIONAL THERAPY INITIAL EVALUATION ADULT - TOILETING, PREVIOUS LEVEL OF FUNCTION, OT EVAL
independent I have personally seen and examined this patient.  I have fully participated in the care of this patient. I have reviewed all pertinent clinical information, including history, physical exam, plan and the Resident’s note and agree except as noted.

## 2018-07-23 NOTE — PROGRESS NOTE ADULT - SUBJECTIVE AND OBJECTIVE BOX
OPERATIVE PROCEDURE(s):                POD #                       62yMale  SURGEON(s): RIN Gautam  SUBJECTIVE ASSESSMENT:   Vital Signs Last 24 Hrs  T(F): 98.1 (23 Jul 2018 06:00), Max: 100.2 (22 Jul 2018 08:15)  HR: 70 (23 Jul 2018 06:00) (66 - 82)  BP: 114/67 (23 Jul 2018 06:00) (107/64 - 144/72)  BP(mean): 92 (23 Jul 2018 06:00) (81 - 109)  ABP: --  ABP(mean): --  RR: --  SpO2: 99% (23 Jul 2018 06:00) (96% - 100%)  CVP(mm Hg): --  CVP(cm H2O): --  CO: --  CI: --  PA: --  SVR: --    I&O's Detail    22 Jul 2018 07:01  -  23 Jul 2018 07:00  --------------------------------------------------------  IN:    amiodarone Infusion: 383.4 mL    insulin Infusion: 56 mL    IV PiggyBack: 100 mL    norepinephrine Infusion: 35 mL    Oral Fluid: 480 mL    sodium chloride 0.9%: 100 mL    sodium chloride 0.9%.: 200 mL  Total IN: 1354.4 mL    OUT:    Indwelling Catheter - Urethral: 187 mL    Post-Void Residual per Intermittent Catheterization: 125 mL    Voided: 600 mL  Total OUT: 912 mL        Net:   I&O's Detail    21 Jul 2018 07:01  -  22 Jul 2018 07:00  --------------------------------------------------------  Total NET: 750.1 mL      22 Jul 2018 07:01  -  23 Jul 2018 07:00  --------------------------------------------------------  Total NET: 442.4 mL        CAPILLARY BLOOD GLUCOSE  124 (23 Jul 2018 05:00)  99 (23 Jul 2018 02:00)  109 (22 Jul 2018 23:00)  120 (22 Jul 2018 19:00)  115 (22 Jul 2018 15:00)  131 (22 Jul 2018 12:15)  136 (22 Jul 2018 08:15)  121 (22 Jul 2018 06:11)  149 (22 Jul 2018 02:45)        Physical Exam:  General: NAD; A&Ox3/Patient is intubated and sedated  Cardiac: S1/S2, RRR, no murmur, no rubs  Lungs: unlabored respirations, CTA b/l, no wheeze, no rales, no crackles  Abdomen: Soft/NT/ND; positive bowel sounds x 4  Sternum: Intact, no click, incision healing well with no drainage  Incisions: Incisions clean/dry/intact  Extremities: No edema b/l lower extremities; good capillary refill; no cyanosis; palpable 1+ pedal pulses b/l    Central Venous Catheter: Yes[]  No[] , If Yes indication:           Day #  Santos Catheter: Yes  [] , No  [] , If yes indication:                      Day #  NGT: Yes [] No [] ,    If Yes Placement:                                     Day #  EPICARDIAL WIRES:  [] YES [] NO                                              Day #  BOWEL MOVEMENT:  [] YES [] NO, If No, Timing since last BM:      Day #  CHEST TUBE(Left/Right):  [] YES [] NO, If yes -  AIR LEAKS:  [] YES [] NO        LABS:                        9.9<L>  9.84  )-----------( 128<L>    ( 23 Jul 2018 02:00 )             28.3<L>                        10.3<L>  13.17<H> )-----------( 142      ( 22 Jul 2018 02:00 )             29.5<L>    07-23    137  |  102  |  15  ----------------------------<  134<H>  4.3   |  24  |  1.1  07-22    138  |  104  |  13  ----------------------------<  141<H>  4.0   |  22  |  1.3    Ca    7.9<L>      23 Jul 2018 02:00  Mg     2.9     07-23    TPro  5.8<L> [6.0 - 8.0]  /  Alb  4.4 [3.5 - 5.2]  /  TBili  0.8 [0.2 - 1.2]  /  DBili  x   /  AST  30 [0 - 41]  /  ALT  11 [0 - 41]  /  AlkPhos  23<L> [30 - 115]  07-21          RADIOLOGY & ADDITIONAL TESTS:  CXR:  EKG:  MEDICATIONS  (STANDING):  ALBUTerol/ipratropium for Nebulization 3 milliLiter(s) Nebulizer every 6 hours  amiodarone Infusion 0.5 mG/Min (16.667 mL/Hr) IV Continuous <Continuous>  aspirin enteric coated 325 milliGRAM(s) Oral daily  atorvastatin 40 milliGRAM(s) Oral at bedtime  dextrose 50% Injectable 50 milliLiter(s) IV Push every 15 minutes  dextrose 50% Injectable 25 milliLiter(s) IV Push every 15 minutes  docusate sodium 100 milliGRAM(s) Oral three times a day  furosemide   Injectable 40 milliGRAM(s) IV Push once  guaiFENesin  milliGRAM(s) Oral every 12 hours  insulin Infusion 1 Unit(s)/Hr (1 mL/Hr) IV Continuous <Continuous>  magnesium sulfate  IVPB 1 Gram(s) IV Intermittent every 12 hours  norepinephrine Infusion 0.05 MICROgram(s)/kG/Min (8.916 mL/Hr) IV Continuous <Continuous>  pantoprazole    Tablet 40 milliGRAM(s) Oral before breakfast  sodium chloride 0.9%. 1000 milliLiter(s) (10 mL/Hr) IV Continuous <Continuous>    MEDICATIONS  (PRN):  acetaminophen   Tablet. 650 milliGRAM(s) Oral every 6 hours PRN Moderate Pain (4 - 6)  fentaNYL    Injectable 25 MICROGram(s) IV Push every 1 hour PRN Severe Pain (7 - 10)  ketorolac   Injectable 15 milliGRAM(s) IV Push every 4 hours PRN Moderate Pain (4 - 6)  oxyCODONE    IR 10 milliGRAM(s) Oral every 4 hours PRN Severe Pain (7 - 10)  oxyCODONE    IR 5 milliGRAM(s) Oral every 4 hours PRN Moderate Pain (4 - 6)  senna 1 Tablet(s) Oral every 12 hours PRN Constipation    HEPARIN:  [] YES [] NO  Dose: XX UNITS/HR UNITS Q8H  LOVENOX:[] YES [] NO  Dose: XX mg Q24H  COUMADIN: []  YES [] NO  Dose: XX mg  Q24H  SCD's: YES b/l  GI Prophylaxis: Protonix [], Pepcid [], None [], (Contra-indication:.....)    Post-Op Beta-Blockers: Yes [], No[], If No, then contraindication:  Post-Op Aspirin: Yes [],  No [], If No, then contraindication:  Post-Op Statin: Yes [], No[], If No, then contraindication:  Allergies    No Known Allergies    Intolerances      Ambulation/Activity Status:    Assessment/Plan:  62y Male status-post .....  - Case and plan discussed with CTU Intensivist and CT Surgeon - Dr. Gautam/Murali/Nicki   - Continue CTU supportive care    - Continue DVT/GI prophylaxis  - Incentive Spirometry 10 times an hour  - Continue to advance physical activity as tolerated and continue PT/OT as directed  1. CAD: Continue ASA, statin, BB  2. HTN:   3. A. Fib:   4. COPD/Hypoxia:   5. DM/Glucose Control:     Social Service Disposition: OPERATIVE PROCEDURE(s):                POD #                       62yMale  SURGEON(s): RIN Gautam  SUBJECTIVE ASSESSMENT:   Vital Signs Last 24 Hrs  T(F): 98.1 (23 Jul 2018 06:00), Max: 100.2 (22 Jul 2018 08:15)  HR: 70 (23 Jul 2018 06:00) (66 - 82)  BP: 114/67 (23 Jul 2018 06:00) (107/64 - 144/72)  BP(mean): 92 (23 Jul 2018 06:00) (81 - 109)  SpO2: 99% (23 Jul 2018 06:00) (96% - 100%)    I&O's Detail    22 Jul 2018 07:01  -  23 Jul 2018 07:00  --------------------------------------------------------  IN:    amiodarone Infusion: 383.4 mL    insulin Infusion: 56 mL    IV PiggyBack: 100 mL    norepinephrine Infusion: 35 mL    Oral Fluid: 480 mL    sodium chloride 0.9%: 100 mL    sodium chloride 0.9%.: 200 mL  Total IN: 1354.4 mL    OUT:    Indwelling Catheter - Urethral: 187 mL    Post-Void Residual per Intermittent Catheterization: 125 mL    Voided: 600 mL  Total OUT: 912 mL        Net:   I&O's Detail    21 Jul 2018 07:01  -  22 Jul 2018 07:00  --------------------------------------------------------  Total NET: 750.1 mL      22 Jul 2018 07:01  -  23 Jul 2018 07:00  --------------------------------------------------------  Total NET: 442.4 mL        CAPILLARY BLOOD GLUCOSE  124 (23 Jul 2018 05:00)  99 (23 Jul 2018 02:00)  109 (22 Jul 2018 23:00)  120 (22 Jul 2018 19:00)  115 (22 Jul 2018 15:00)  131 (22 Jul 2018 12:15)  136 (22 Jul 2018 08:15)  121 (22 Jul 2018 06:11)  149 (22 Jul 2018 02:45)        Physical Exam:  General: NAD; A&Ox3/Patient is intubated and sedated  Cardiac: S1/S2, RRR, no murmur, no rubs  Lungs: unlabored respirations, CTA b/l, no wheeze, no rales, no crackles  Abdomen: Soft/NT/ND; positive bowel sounds x 4  Sternum: Intact, no click, incision healing well with no drainage  Incisions: Incisions clean/dry/intact  Extremities: No edema b/l lower extremities; good capillary refill; no cyanosis; palpable 1+ pedal pulses b/l    Central Venous Catheter: Yes[]  No[] , If Yes indication:           Day #  Santos Catheter: Yes  [] , No  [] , If yes indication:                      Day #  NGT: Yes [] No [] ,    If Yes Placement:                                     Day #  EPICARDIAL WIRES:  [] YES [] NO                                              Day #  BOWEL MOVEMENT:  [] YES [] NO, If No, Timing since last BM:      Day #  CHEST TUBE(Left/Right):  [] YES [] NO, If yes -  AIR LEAKS:  [] YES [] NO        LABS:                        9.9<L>  9.84  )-----------( 128<L>    ( 23 Jul 2018 02:00 )             28.3<L>                        10.3<L>  13.17<H> )-----------( 142      ( 22 Jul 2018 02:00 )             29.5<L>    07-23    137  |  102  |  15  ----------------------------<  134<H>  4.3   |  24  |  1.1  07-22    138  |  104  |  13  ----------------------------<  141<H>  4.0   |  22  |  1.3    Ca    7.9<L>      23 Jul 2018 02:00  Mg     2.9     07-23    TPro  5.8<L> [6.0 - 8.0]  /  Alb  4.4 [3.5 - 5.2]  /  TBili  0.8 [0.2 - 1.2]  /  DBili  x   /  AST  30 [0 - 41]  /  ALT  11 [0 - 41]  /  AlkPhos  23<L> [30 - 115]  07-21          RADIOLOGY & ADDITIONAL TESTS:  CXR:   EKG:  MEDICATIONS  (STANDING):  ALBUTerol/ipratropium for Nebulization 3 milliLiter(s) Nebulizer every 6 hours  amiodarone Infusion 0.5 mG/Min (16.667 mL/Hr) IV Continuous <Continuous>  aspirin enteric coated 325 milliGRAM(s) Oral daily  atorvastatin 40 milliGRAM(s) Oral at bedtime  dextrose 50% Injectable 50 milliLiter(s) IV Push every 15 minutes  dextrose 50% Injectable 25 milliLiter(s) IV Push every 15 minutes  docusate sodium 100 milliGRAM(s) Oral three times a day  furosemide   Injectable 40 milliGRAM(s) IV Push once  guaiFENesin  milliGRAM(s) Oral every 12 hours  insulin Infusion 1 Unit(s)/Hr (1 mL/Hr) IV Continuous <Continuous>  magnesium sulfate  IVPB 1 Gram(s) IV Intermittent every 12 hours  norepinephrine Infusion 0.05 MICROgram(s)/kG/Min (8.916 mL/Hr) IV Continuous <Continuous>  pantoprazole    Tablet 40 milliGRAM(s) Oral before breakfast  sodium chloride 0.9%. 1000 milliLiter(s) (10 mL/Hr) IV Continuous <Continuous>    MEDICATIONS  (PRN):  acetaminophen   Tablet. 650 milliGRAM(s) Oral every 6 hours PRN Moderate Pain (4 - 6)  fentaNYL    Injectable 25 MICROGram(s) IV Push every 1 hour PRN Severe Pain (7 - 10)  ketorolac   Injectable 15 milliGRAM(s) IV Push every 4 hours PRN Moderate Pain (4 - 6)  oxyCODONE    IR 10 milliGRAM(s) Oral every 4 hours PRN Severe Pain (7 - 10)  oxyCODONE    IR 5 milliGRAM(s) Oral every 4 hours PRN Moderate Pain (4 - 6)  senna 1 Tablet(s) Oral every 12 hours PRN Constipation    HEPARIN:  [] YES [] NO  Dose: XX UNITS/HR UNITS Q8H  LOVENOX:[] YES [] NO  Dose: XX mg Q24H  COUMADIN: []  YES [] NO  Dose: XX mg  Q24H  SCD's: YES b/l  GI Prophylaxis: Protonix [], Pepcid [], None [], (Contra-indication:.....)    Post-Op Beta-Blockers: Yes [], No[], If No, then contraindication:  Post-Op Aspirin: Yes [],  No [], If No, then contraindication:  Post-Op Statin: Yes [], No[], If No, then contraindication:  Allergies    No Known Allergies    Intolerances      Ambulation/Activity Status:    Assessment/Plan:  62y Male status-post .....  - Case and plan discussed with CTU Intensivist and CT Surgeon - Dr. Gautam/Murali/Nicki   - Continue CTU supportive care    - Continue DVT/GI prophylaxis  - Incentive Spirometry 10 times an hour  - Continue to advance physical activity as tolerated and continue PT/OT as directed  1. CAD: Continue ASA, statin, BB  2. HTN:   3. A. Fib:   4. COPD/Hypoxia:   5. DM/Glucose Control:   amio po  metformin  lopressor  lasix  Social Service Disposition: OPERATIVE PROCEDURE(s):      CABGx2          POD #  3                     62yMale  SURGEON(s): RIN Gautam  SUBJECTIVE ASSESSMENT: pt seen and examined. pt has some sternal incision pain, which improved with toradol   Vital Signs Last 24 Hrs  T(F): 98.1 (23 Jul 2018 06:00), Max: 100.2 (22 Jul 2018 08:15)  HR: 70 (23 Jul 2018 06:00) (66 - 82)  BP: 114/67 (23 Jul 2018 06:00) (107/64 - 144/72)  BP(mean): 92 (23 Jul 2018 06:00) (81 - 109)  SpO2: 99% (23 Jul 2018 06:00) (96% - 100%)    I&O's Detail    22 Jul 2018 07:01  -  23 Jul 2018 07:00  --------------------------------------------------------  IN:    amiodarone Infusion: 383.4 mL    insulin Infusion: 56 mL    IV PiggyBack: 100 mL    norepinephrine Infusion: 35 mL    Oral Fluid: 480 mL    sodium chloride 0.9%: 100 mL    sodium chloride 0.9%.: 200 mL  Total IN: 1354.4 mL    OUT:    Indwelling Catheter - Urethral: 187 mL    Post-Void Residual per Intermittent Catheterization: 125 mL    Voided: 600 mL  Total OUT: 912 mL        Net:   I&O's Detail    21 Jul 2018 07:01  -  22 Jul 2018 07:00  --------------------------------------------------------  Total NET: 750.1 mL      22 Jul 2018 07:01  -  23 Jul 2018 07:00  --------------------------------------------------------  Total NET: 442.4 mL        CAPILLARY BLOOD GLUCOSE  124 (23 Jul 2018 05:00)  99 (23 Jul 2018 02:00)  109 (22 Jul 2018 23:00)  120 (22 Jul 2018 19:00)  115 (22 Jul 2018 15:00)  131 (22 Jul 2018 12:15)  136 (22 Jul 2018 08:15)  121 (22 Jul 2018 06:11)  149 (22 Jul 2018 02:45)        Physical Exam:  General: NAD; A&Ox3  Cardiac: S1/S2, RRR, no murmur, no rubs  Lungs: coarse bs bilaterally, decreased bs at bases  Abdomen: Soft/NT/ND; positive bowel sounds x 4  Sternum: Intact, no click, incision healing well with no drainage  Incisions: Incisions clean/dry/intact  Extremities: No edema b/l lower extremities; good capillary refill; no cyanosis; palpable 1+ pedal pulses b/l    Central Venous Catheter: Yes[x]  No[] , If Yes indication:     access      Day #3  Santos Catheter: Yes  [] , No  [x] , If yes indication:                      Day #  NGT: Yes [] No [x] ,    If Yes Placement:                                     Day #  EPICARDIAL WIRES:  [x] YES [] NO                                              Day # 3  BOWEL MOVEMENT:  [] YES [x] NO, If No, Timing since last BM:      Day #  CHEST TUBE(Left/Right):  [] YES [x] NO, If yes -  AIR LEAKS:  [] YES [] NO        LABS:                        9.9<L>  9.84  )-----------( 128<L>    ( 23 Jul 2018 02:00 )             28.3<L>                        10.3<L>  13.17<H> )-----------( 142      ( 22 Jul 2018 02:00 )             29.5<L>    07-23    137  |  102  |  15  ----------------------------<  134<H>  4.3   |  24  |  1.1  07-22    138  |  104  |  13  ----------------------------<  141<H>  4.0   |  22  |  1.3    Ca    7.9<L>      23 Jul 2018 02:00  Mg     2.9     07-23    TPro  5.8<L> [6.0 - 8.0]  /  Alb  4.4 [3.5 - 5.2]  /  TBili  0.8 [0.2 - 1.2]  /  DBili  x   /  AST  30 [0 - 41]  /  ALT  11 [0 - 41]  /  AlkPhos  23<L> [30 - 115]  07-21          RADIOLOGY & ADDITIONAL TESTS:  CXR:  < from: Xray Chest 1 View- PORTABLE-Routine (07.23.18 @ 04:31) >  Impression:      Unchanged small left pleural effusion with left basilar   atelectasis/opacity.    < end of copied text >    EKG:  MEDICATIONS  (STANDING):  ALBUTerol/ipratropium for Nebulization 3 milliLiter(s) Nebulizer every 6 hours  amiodarone Infusion 0.5 mG/Min (16.667 mL/Hr) IV Continuous <Continuous>  aspirin enteric coated 325 milliGRAM(s) Oral daily  atorvastatin 40 milliGRAM(s) Oral at bedtime  dextrose 50% Injectable 50 milliLiter(s) IV Push every 15 minutes  dextrose 50% Injectable 25 milliLiter(s) IV Push every 15 minutes  docusate sodium 100 milliGRAM(s) Oral three times a day  furosemide   Injectable 40 milliGRAM(s) IV Push once  guaiFENesin  milliGRAM(s) Oral every 12 hours  insulin Infusion 1 Unit(s)/Hr (1 mL/Hr) IV Continuous <Continuous>  magnesium sulfate  IVPB 1 Gram(s) IV Intermittent every 12 hours  norepinephrine Infusion 0.05 MICROgram(s)/kG/Min (8.916 mL/Hr) IV Continuous <Continuous>  pantoprazole    Tablet 40 milliGRAM(s) Oral before breakfast  sodium chloride 0.9%. 1000 milliLiter(s) (10 mL/Hr) IV Continuous <Continuous>    MEDICATIONS  (PRN):  acetaminophen   Tablet. 650 milliGRAM(s) Oral every 6 hours PRN Moderate Pain (4 - 6)  fentaNYL    Injectable 25 MICROGram(s) IV Push every 1 hour PRN Severe Pain (7 - 10)  ketorolac   Injectable 15 milliGRAM(s) IV Push every 4 hours PRN Moderate Pain (4 - 6)  oxyCODONE    IR 10 milliGRAM(s) Oral every 4 hours PRN Severe Pain (7 - 10)  oxyCODONE    IR 5 milliGRAM(s) Oral every 4 hours PRN Moderate Pain (4 - 6)  senna 1 Tablet(s) Oral every 12 hours PRN Constipation    HEPARIN:  [] YES [x] NO  Dose: XX UNITS/HR UNITS Q8H  LOVENOX:[] YES [x] NO  Dose: XX mg Q24H  COUMADIN: []  YES [x] NO  Dose: XX mg  Q24H  SCD's: YES b/l  GI Prophylaxis: Protonix [x], Pepcid [], None [], (Contra-indication:.....)    Post-Op Beta-Blockers: Yes [x], No[], If No, then contraindication: start todAY LOPRESSOR 12.5MG BID  Post-Op Aspirin: Yes [X],  No [], If No, then contraindication:  Post-Op Statin: Yes [X], No[], If No, then contraindication:  Allergies    No Known Allergies    Intolerances      Ambulation/Activity Status: ambulate     Assessment/Plan:  62y Male status-post CABGx2 POD#3  - Case and plan discussed with CTU Intensivist and CT Surgeon - Dr. Gautam   - Continue CTU supportive care    - Continue DVT/GI prophylaxis  - Incentive Spirometry 10 times an hour  - Continue to advance physical activity as tolerated and continue PT/OT as directed  1. CAD: Continue ASA, statin, start lopressor 12.5mg bid, lasix for fluid overload  2. HTN: lopressor   3. A. Fib: transition gtt to po amio   4. COPD/Hypoxia: nebs, mucinex  5. DM/Glucose Control: start metformin    Social Service Disposition:  home

## 2018-07-24 ENCOUNTER — TRANSCRIPTION ENCOUNTER (OUTPATIENT)
Age: 63
End: 2018-07-24

## 2018-07-24 VITALS
OXYGEN SATURATION: 99 % | TEMPERATURE: 99 F | SYSTOLIC BLOOD PRESSURE: 136 MMHG | DIASTOLIC BLOOD PRESSURE: 78 MMHG | HEART RATE: 82 BPM | RESPIRATION RATE: 18 BRPM

## 2018-07-24 LAB
ANION GAP SERPL CALC-SCNC: 12 MMOL/L — SIGNIFICANT CHANGE UP (ref 7–14)
BASOPHILS # BLD AUTO: 0.02 K/UL — SIGNIFICANT CHANGE UP (ref 0–0.2)
BASOPHILS NFR BLD AUTO: 0.3 % — SIGNIFICANT CHANGE UP (ref 0–1)
BUN SERPL-MCNC: 19 MG/DL — SIGNIFICANT CHANGE UP (ref 10–20)
CALCIUM SERPL-MCNC: 7.7 MG/DL — LOW (ref 8.5–10.1)
CHLORIDE SERPL-SCNC: 105 MMOL/L — SIGNIFICANT CHANGE UP (ref 98–110)
CO2 SERPL-SCNC: 25 MMOL/L — SIGNIFICANT CHANGE UP (ref 17–32)
CREAT SERPL-MCNC: 1.3 MG/DL — SIGNIFICANT CHANGE UP (ref 0.7–1.5)
EOSINOPHIL # BLD AUTO: 0.14 K/UL — SIGNIFICANT CHANGE UP (ref 0–0.7)
EOSINOPHIL NFR BLD AUTO: 2.1 % — SIGNIFICANT CHANGE UP (ref 0–8)
GLUCOSE SERPL-MCNC: 118 MG/DL — HIGH (ref 70–99)
HCT VFR BLD CALC: 26.3 % — LOW (ref 42–52)
HGB BLD-MCNC: 9.1 G/DL — LOW (ref 14–18)
IMM GRANULOCYTES NFR BLD AUTO: 0.5 % — HIGH (ref 0.1–0.3)
LYMPHOCYTES # BLD AUTO: 1.37 K/UL — SIGNIFICANT CHANGE UP (ref 1.2–3.4)
LYMPHOCYTES # BLD AUTO: 20.8 % — SIGNIFICANT CHANGE UP (ref 20.5–51.1)
MAGNESIUM SERPL-MCNC: 2.8 MG/DL — HIGH (ref 1.8–2.4)
MCHC RBC-ENTMCNC: 32.2 PG — HIGH (ref 27–31)
MCHC RBC-ENTMCNC: 34.6 G/DL — SIGNIFICANT CHANGE UP (ref 32–37)
MCV RBC AUTO: 92.9 FL — SIGNIFICANT CHANGE UP (ref 80–94)
MONOCYTES # BLD AUTO: 0.7 K/UL — HIGH (ref 0.1–0.6)
MONOCYTES NFR BLD AUTO: 10.6 % — HIGH (ref 1.7–9.3)
NEUTROPHILS # BLD AUTO: 4.34 K/UL — SIGNIFICANT CHANGE UP (ref 1.4–6.5)
NEUTROPHILS NFR BLD AUTO: 65.7 % — SIGNIFICANT CHANGE UP (ref 42.2–75.2)
NRBC # BLD: 0 /100 WBCS — SIGNIFICANT CHANGE UP (ref 0–0)
PLATELET # BLD AUTO: 150 K/UL — SIGNIFICANT CHANGE UP (ref 130–400)
POTASSIUM SERPL-MCNC: 4.4 MMOL/L — SIGNIFICANT CHANGE UP (ref 3.5–5)
POTASSIUM SERPL-SCNC: 4.4 MMOL/L — SIGNIFICANT CHANGE UP (ref 3.5–5)
RBC # BLD: 2.83 M/UL — LOW (ref 4.7–6.1)
RBC # FLD: 13.6 % — SIGNIFICANT CHANGE UP (ref 11.5–14.5)
SODIUM SERPL-SCNC: 142 MMOL/L — SIGNIFICANT CHANGE UP (ref 135–146)
WBC # BLD: 6.6 K/UL — SIGNIFICANT CHANGE UP (ref 4.8–10.8)
WBC # FLD AUTO: 6.6 K/UL — SIGNIFICANT CHANGE UP (ref 4.8–10.8)

## 2018-07-24 RX ORDER — ACETAMINOPHEN 500 MG
2 TABLET ORAL
Qty: 0 | Refills: 0 | DISCHARGE
Start: 2018-07-24

## 2018-07-24 RX ORDER — AMIODARONE HYDROCHLORIDE 400 MG/1
1 TABLET ORAL
Qty: 30 | Refills: 0
Start: 2018-07-24

## 2018-07-24 RX ORDER — METFORMIN HYDROCHLORIDE 850 MG/1
1 TABLET ORAL
Qty: 60 | Refills: 0
Start: 2018-07-24

## 2018-07-24 RX ORDER — PANTOPRAZOLE SODIUM 20 MG/1
1 TABLET, DELAYED RELEASE ORAL
Qty: 30 | Refills: 0
Start: 2018-07-24

## 2018-07-24 RX ORDER — METOPROLOL TARTRATE 50 MG
0.5 TABLET ORAL
Qty: 30 | Refills: 0
Start: 2018-07-24

## 2018-07-24 RX ORDER — ASPIRIN/CALCIUM CARB/MAGNESIUM 324 MG
1 TABLET ORAL
Qty: 30 | Refills: 0
Start: 2018-07-24

## 2018-07-24 RX ORDER — DOCUSATE SODIUM 100 MG
1 CAPSULE ORAL
Qty: 90 | Refills: 0
Start: 2018-07-24

## 2018-07-24 RX ORDER — ASPIRIN/CALCIUM CARB/MAGNESIUM 324 MG
1 TABLET ORAL
Qty: 0 | Refills: 0 | COMMUNITY

## 2018-07-24 RX ORDER — BENAZEPRIL HYDROCHLORIDE 40 MG/1
1 TABLET ORAL
Qty: 0 | Refills: 0 | COMMUNITY

## 2018-07-24 RX ADMIN — Medication 12.5 MILLIGRAM(S): at 17:50

## 2018-07-24 RX ADMIN — Medication 325 MILLIGRAM(S): at 12:56

## 2018-07-24 RX ADMIN — AMIODARONE HYDROCHLORIDE 200 MILLIGRAM(S): 400 TABLET ORAL at 06:16

## 2018-07-24 RX ADMIN — Medication 12.5 MILLIGRAM(S): at 06:16

## 2018-07-24 RX ADMIN — Medication 600 MILLIGRAM(S): at 17:50

## 2018-07-24 RX ADMIN — Medication 3 MILLILITER(S): at 07:56

## 2018-07-24 RX ADMIN — Medication 100 GRAM(S): at 06:16

## 2018-07-24 RX ADMIN — Medication 600 MILLIGRAM(S): at 06:16

## 2018-07-24 RX ADMIN — METFORMIN HYDROCHLORIDE 1000 MILLIGRAM(S): 850 TABLET ORAL at 06:16

## 2018-07-24 RX ADMIN — PANTOPRAZOLE SODIUM 40 MILLIGRAM(S): 20 TABLET, DELAYED RELEASE ORAL at 06:17

## 2018-07-24 NOTE — DISCHARGE NOTE ADULT - CARE PLAN
Principal Discharge DX:	Coronary artery disease  Goal:	s/p cabg  Assessment and plan of treatment:	pain control. continue medications as prescribed. follow up with Dr. Gautam in 1 week.

## 2018-07-24 NOTE — PROGRESS NOTE ADULT - SUBJECTIVE AND OBJECTIVE BOX
Over Night Events:  diuresed well overnight on his own without lasix    FS good control on metformin      ROS:  See HPI    PHYSICAL EXAM    ICU Vital Signs Last 24 Hrs  T(C): 36.2 (24 Jul 2018 07:55), Max: 37.8 (23 Jul 2018 21:13)  T(F): 97.2 (24 Jul 2018 07:55), Max: 100 (23 Jul 2018 21:13)  HR: 80 (24 Jul 2018 09:58) (72 - 86)  BP: 128/64 (24 Jul 2018 09:58) (112/63 - 143/73)  BP(mean): 90 (24 Jul 2018 09:58) (77 - 104)  ABP: --  ABP(mean): --  RR: 17 (24 Jul 2018 09:58) (12 - 34)  SpO2: 100% (24 Jul 2018 09:58) (96% - 100%)      General:AO x3  HEENT:           Nl     Lymph Nodes: NO cervical LN   Lungs: Bilateral BS  Cardiovascular: Regular   Abdomen: Soft, Positive BS  Extremities: No clubbing   Skin: Nl  Neurological: Nl      LABS:                          9.1    6.60  )-----------( 150      ( 24 Jul 2018 02:00 )             26.3                                               07-24    142  |  105  |  19  ----------------------------<  118<H>  4.4   |  25  |  1.3    Ca    7.7<L>      24 Jul 2018 02:00  Mg     2.8     07-24                                                                                                                                                                                                                           MEDICATIONS  (STANDING):  ALBUTerol/ipratropium for Nebulization 3 milliLiter(s) Nebulizer every 6 hours  amiodarone    Tablet 200 milliGRAM(s) Oral daily  aspirin enteric coated 325 milliGRAM(s) Oral daily  atorvastatin 40 milliGRAM(s) Oral at bedtime  dextrose 5%. 1000 milliLiter(s) (50 mL/Hr) IV Continuous <Continuous>  dextrose 50% Injectable 50 milliLiter(s) IV Push every 15 minutes  dextrose 50% Injectable 25 milliLiter(s) IV Push every 15 minutes  docusate sodium 100 milliGRAM(s) Oral three times a day  guaiFENesin  milliGRAM(s) Oral every 12 hours  insulin lispro (HumaLOG) corrective regimen sliding scale   SubCutaneous three times a day before meals  magnesium sulfate  IVPB 1 Gram(s) IV Intermittent every 12 hours  metFORMIN 1000 milliGRAM(s) Oral two times a day  metoprolol tartrate 12.5 milliGRAM(s) Oral two times a day  pantoprazole    Tablet 40 milliGRAM(s) Oral before breakfast  sodium chloride 0.9%. 1000 milliLiter(s) (10 mL/Hr) IV Continuous <Continuous>    MEDICATIONS  (PRN):  acetaminophen   Tablet. 650 milliGRAM(s) Oral every 6 hours PRN Moderate Pain (4 - 6)  dextrose 40% Gel 15 Gram(s) Oral once PRN Blood Glucose LESS THAN 70 milliGRAM(s)/deciliter  fentaNYL    Injectable 25 MICROGram(s) IV Push every 1 hour PRN Severe Pain (7 - 10)  glucagon  Injectable 1 milliGRAM(s) IntraMuscular once PRN Glucose LESS THAN 70 milligrams/deciliter  oxyCODONE    IR 10 milliGRAM(s) Oral every 4 hours PRN Severe Pain (7 - 10)  oxyCODONE    IR 5 milliGRAM(s) Oral every 4 hours PRN Moderate Pain (4 - 6)  senna 1 Tablet(s) Oral every 12 hours PRN Constipation      Xrays:                                                                                     ECHO    IMPRESSION:

## 2018-07-24 NOTE — DISCHARGE NOTE ADULT - MEDICATION SUMMARY - MEDICATIONS TO STOP TAKING
I will STOP taking the medications listed below when I get home from the hospital:    benazepril 20 mg oral tablet  -- 1 tab(s) by mouth once a day

## 2018-07-24 NOTE — PROGRESS NOTE ADULT - ASSESSMENT
Assessment/Plan:  s/p CABG x 2-POD #2  9-[id number] off vasopressor support as tolerated-maintain SBP >10 mmHg  2-serum glucose control-insulin infusion  3-A-fib, now NSR-continue amiodarone infusion  4-acute blood loss anemia/thrombocytopenia-stable, continue to monitor Hb/Hct/plts daily
Assessment/Plan:  s/p CABG x 2-POD #4  continue asa bb statins  1-hypertension on BBlocker  2-serum glucose control- tolerating metformin 1gm q12H monitor cr   3-s/pA-fib, now NSR-continue amiodaronepo 200 mg daily  4-acute blood loss anemia/thrombocytopenia-stable, continue to monitor Hb/Hct/plts daily  possibly will be D/c home in am
CTS ATTENDING    AWAKE AND ALERT  C/O INCISIONAL PAIN AT PLEURAL TUBE SITE  CxR SHOWS SOME CONGESTION  URINE OUTPUT WAS LOW THIS MORNING, LASIX WAS GIVEN AS PATINET WAS +800 IN PAST 24 HRS.    CONTINUE INSULIN DRIP, AMIODARONE AND USE SMALL DOSE OF PRESSORS TO SUPPORT RENAL PERFUSION    KEEP DRAINS IN PLACE UNTIL DRY, AS PER DR. HAUSER

## 2018-07-24 NOTE — DISCHARGE NOTE ADULT - MEDICATION SUMMARY - MEDICATIONS TO TAKE
I will START or STAY ON the medications listed below when I get home from the hospital:    aspirin 325 mg oral delayed release tablet  -- 1 tab(s) by mouth once a day  -- Indication: For Coronary Artery Disease    acetaminophen 325 mg oral tablet  -- 2 tab(s) by mouth every 6 hours, As needed, Moderate Pain (4 - 6)  -- Indication: For pain control s/p cabg    amiodarone 200 mg oral tablet  -- 1 tab(s) by mouth once a day  -- Indication: For a fib    metFORMIN 1000 mg oral tablet  -- 1 tab(s) by mouth 2 times a day  -- Indication: For diabetes    Crestor 40 mg oral tablet  -- 1 tab(s) by mouth once a day (at bedtime)  -- Indication: For dyslipidemia    metoprolol tartrate 25 mg oral tablet  -- 0.5 tab(s) by mouth 2 times a day   -- It is very important that you take or use this exactly as directed.  Do not skip doses or discontinue unless directed by your doctor.  May cause drowsiness.  Alcohol may intensify this effect.  Use care when operating dangerous machinery.  Some non-prescription drugs may aggravate your condition.  Read all labels carefully.  If a warning appears, check with your doctor before taking.  Take with food or milk.  This drug may impair the ability to drive or operate machinery.  Use care until you become familiar with its effects.    -- Indication: For hypertension    docusate sodium 100 mg oral capsule  -- 1 cap(s) by mouth 3 times a day  -- Indication: For constipation    pantoprazole 40 mg oral delayed release tablet  -- 1 tab(s) by mouth once a day (before a meal)  -- Indication: For gi prophylaxis

## 2018-07-24 NOTE — DISCHARGE NOTE ADULT - PATIENT PORTAL LINK FT
You can access the [facility] Patient Portal, offered by [facility], by registering with the following website: [email or web address]

## 2018-07-24 NOTE — DISCHARGE NOTE ADULT - HOSPITAL COURSE
62yM PMhx of CAd s/p PCI to LAD in 2010 on aspirin and crestor presents for elective cardiac catheterization and possible intervention. has has symptoms of progressive angina over the course of the last month. Patient with double vessel disease became unstable during procedure requiring insertion of IABP and emergent CTS consultation for myocardial revascularization. Post-operatively, patient developed atrial fibrillation s/p failed cardioversion. He eventually converted to NSR pharmacologically. The rest of his post-operative course was uneventful. He was discharged home in stable condition on POD#4, with instructions to follow up with Dr. Gautam on August 1, 2018 at 2:00pm.

## 2018-07-24 NOTE — PROGRESS NOTE ADULT - SUBJECTIVE AND OBJECTIVE BOX
OPERATIVE PROCEDURE(s):                POD #                       62yMale  SURGEON(s): RIN Gautam  SUBJECTIVE ASSESSMENT:   Vital Signs Last 24 Hrs  T(F): 99.3 (24 Jul 2018 00:00), Max: 100 (23 Jul 2018 21:13)  HR: 78 (24 Jul 2018 06:00) (72 - 86)  BP: 112/63 (24 Jul 2018 06:00) (112/63 - 143/73)  BP(mean): 94 (24 Jul 2018 06:00) (74 - 104)  RR: 22 (24 Jul 2018 06:00) (12 - 27)  SpO2: 99% (24 Jul 2018 06:00) (96% - 100%)      I&O's Detail    23 Jul 2018 07:01  -  24 Jul 2018 07:00  --------------------------------------------------------  IN:    amiodarone Infusion: 16.7 mL    insulin Infusion: 1 mL    Oral Fluid: 240 mL    sodium chloride 0.9%.: 20 mL  Total IN: 277.7 mL    OUT:    Voided: 2875 mL  Total OUT: 2875 mL        Net:   I&O's Detail    22 Jul 2018 07:01  -  23 Jul 2018 07:00  --------------------------------------------------------  Total NET: 470.1 mL      23 Jul 2018 07:01  -  24 Jul 2018 07:00  --------------------------------------------------------  Total NET: -2597.3 mL        CAPILLARY BLOOD GLUCOSE  122 (24 Jul 2018 05:18)  120 (24 Jul 2018 03:00)  111 (23 Jul 2018 20:13)  132 (23 Jul 2018 16:56)  140 (23 Jul 2018 11:16)  121 (23 Jul 2018 09:30)  124 (23 Jul 2018 05:00)        Physical Exam:  General: NAD; A&Ox3/Patient is intubated and sedated  Cardiac: S1/S2, RRR, no murmur, no rubs  Lungs: unlabored respirations, CTA b/l, no wheeze, no rales, no crackles  Abdomen: Soft/NT/ND; positive bowel sounds x 4  Sternum: Intact, no click, incision healing well with no drainage  Incisions: Incisions clean/dry/intact  Extremities: No edema b/l lower extremities; good capillary refill; no cyanosis; palpable 1+ pedal pulses b/l    Central Venous Catheter: Yes[]  No[] , If Yes indication:           Day #  Santos Catheter: Yes  [] , No  [] , If yes indication:                      Day #  NGT: Yes [] No [] ,    If Yes Placement:                                     Day #  EPICARDIAL WIRES:  [] YES [] NO                                              Day #  BOWEL MOVEMENT:  [] YES [] NO, If No, Timing since last BM:      Day #  CHEST TUBE(Left/Right):  [] YES [] NO, If yes -  AIR LEAKS:  [] YES [] NO        LABS:                        9.1<L>  6.60  )-----------( 150      ( 24 Jul 2018 02:00 )             26.3<L>                        9.9<L>  9.84  )-----------( 128<L>    ( 23 Jul 2018 02:00 )             28.3<L>    07-24    142  |  105  |  19  ----------------------------<  118<H>  4.4   |  25  |  1.3  07-23    137  |  102  |  15  ----------------------------<  134<H>  4.3   |  24  |  1.1    Ca    7.7<L>      24 Jul 2018 02:00  Mg     2.8     07-24      RADIOLOGY & ADDITIONAL TESTS:  CXR:   EKG:  MEDICATIONS  (STANDING):  ALBUTerol/ipratropium for Nebulization 3 milliLiter(s) Nebulizer every 6 hours  amiodarone    Tablet 200 milliGRAM(s) Oral daily  aspirin enteric coated 325 milliGRAM(s) Oral daily  atorvastatin 40 milliGRAM(s) Oral at bedtime  dextrose 5%. 1000 milliLiter(s) (50 mL/Hr) IV Continuous <Continuous>  dextrose 50% Injectable 50 milliLiter(s) IV Push every 15 minutes  dextrose 50% Injectable 25 milliLiter(s) IV Push every 15 minutes  docusate sodium 100 milliGRAM(s) Oral three times a day  guaiFENesin  milliGRAM(s) Oral every 12 hours  insulin lispro (HumaLOG) corrective regimen sliding scale   SubCutaneous three times a day before meals  magnesium sulfate  IVPB 1 Gram(s) IV Intermittent every 12 hours  metFORMIN 1000 milliGRAM(s) Oral two times a day  metoprolol tartrate 12.5 milliGRAM(s) Oral two times a day  norepinephrine Infusion 0.05 MICROgram(s)/kG/Min (8.916 mL/Hr) IV Continuous <Continuous>  pantoprazole    Tablet 40 milliGRAM(s) Oral before breakfast  sodium chloride 0.9%. 1000 milliLiter(s) (10 mL/Hr) IV Continuous <Continuous>    MEDICATIONS  (PRN):  acetaminophen   Tablet. 650 milliGRAM(s) Oral every 6 hours PRN Moderate Pain (4 - 6)  dextrose 40% Gel 15 Gram(s) Oral once PRN Blood Glucose LESS THAN 70 milliGRAM(s)/deciliter  fentaNYL    Injectable 25 MICROGram(s) IV Push every 1 hour PRN Severe Pain (7 - 10)  glucagon  Injectable 1 milliGRAM(s) IntraMuscular once PRN Glucose LESS THAN 70 milligrams/deciliter  ketorolac   Injectable 15 milliGRAM(s) IV Push every 4 hours PRN Moderate Pain (4 - 6)  oxyCODONE    IR 10 milliGRAM(s) Oral every 4 hours PRN Severe Pain (7 - 10)  oxyCODONE    IR 5 milliGRAM(s) Oral every 4 hours PRN Moderate Pain (4 - 6)  senna 1 Tablet(s) Oral every 12 hours PRN Constipation    HEPARIN:  [] YES [] NO  Dose: XX UNITS/HR UNITS Q8H  LOVENOX:[] YES [] NO  Dose: XX mg Q24H  COUMADIN: []  YES [] NO  Dose: XX mg  Q24H  SCD's: YES b/l  GI Prophylaxis: Protonix [], Pepcid [], None [], (Contra-indication:.....)    Post-Op Beta-Blockers: Yes [], No[], If No, then contraindication:  Post-Op Aspirin: Yes [],  No [], If No, then contraindication:  Post-Op Statin: Yes [], No[], If No, then contraindication:  Allergies    No Known Allergies    Intolerances      Ambulation/Activity Status:    Assessment/Plan:  62y Male status-post .....  - Case and plan discussed with CTU Intensivist and CT Surgeon - Dr. Gautam/Murali/Nicki   - Continue CTU supportive care    - Continue DVT/GI prophylaxis  - Incentive Spirometry 10 times an hour  - Continue to advance physical activity as tolerated and continue PT/OT as directed  1. CAD: Continue ASA, statin, BB  2. HTN:   3. A. Fib:   4. COPD/Hypoxia:   5. DM/Glucose Control:     Social Service Disposition: OPERATIVE PROCEDURE(s):  CABGx2              POD # 4                      62yMale  SURGEON(s): RIN Gautam  SUBJECTIVE ASSESSMENT: pt seen and examined. no acute complaints at this time.   Vital Signs Last 24 Hrs  T(F): 99.3 (24 Jul 2018 00:00), Max: 100 (23 Jul 2018 21:13)  HR: 78 (24 Jul 2018 06:00) (72 - 86)  BP: 112/63 (24 Jul 2018 06:00) (112/63 - 143/73)  BP(mean): 94 (24 Jul 2018 06:00) (74 - 104)  RR: 22 (24 Jul 2018 06:00) (12 - 27)  SpO2: 99% (24 Jul 2018 06:00) (96% - 100%)      I&O's Detail    23 Jul 2018 07:01  -  24 Jul 2018 07:00  --------------------------------------------------------  IN:    amiodarone Infusion: 16.7 mL    insulin Infusion: 1 mL    Oral Fluid: 240 mL    sodium chloride 0.9%.: 20 mL  Total IN: 277.7 mL    OUT:    Voided: 2875 mL  Total OUT: 2875 mL        Net:   I&O's Detail    22 Jul 2018 07:01  -  23 Jul 2018 07:00  --------------------------------------------------------  Total NET: 470.1 mL      23 Jul 2018 07:01  -  24 Jul 2018 07:00  --------------------------------------------------------  Total NET: -2597.3 mL        CAPILLARY BLOOD GLUCOSE  122 (24 Jul 2018 05:18)  120 (24 Jul 2018 03:00)  111 (23 Jul 2018 20:13)  132 (23 Jul 2018 16:56)  140 (23 Jul 2018 11:16)  121 (23 Jul 2018 09:30)  124 (23 Jul 2018 05:00)        Physical Exam:  General: NAD; A&Ox3  Cardiac: S1/S2, RRR, no murmur, no rubs  Lungs: decreased bs at bases bilaterally, left greater than right   Abdomen: Soft/NT/ND; positive bowel sounds x 4  Sternum: Intact, no click, incision healing well with no drainage  Incisions: Incisions clean/dry/intact  Extremities: No edema b/l lower extremities; good capillary refill; no cyanosis; palpable 1+ pedal pulses b/l    Central Venous Catheter: Yes[x]  No[] , If Yes indication:   access        Day #4, will d/c today  Santos Catheter: Yes  [] , No  [x] , If yes indication:                      Day #  NGT: Yes [] No [x] ,    If Yes Placement:                                     Day #  EPICARDIAL WIRES:  [x] YES [] NO                                              Day #4, d/c today  BOWEL MOVEMENT:  [x] YES [] NO, If No, Timing since last BM:      Day #  CHEST TUBE(Left/Right):  [] YES [x] NO, If yes -  AIR LEAKS:  [] YES [] NO        LABS:                        9.1<L>  6.60  )-----------( 150      ( 24 Jul 2018 02:00 )             26.3<L>                        9.9<L>  9.84  )-----------( 128<L>    ( 23 Jul 2018 02:00 )             28.3<L>    07-24    142  |  105  |  19  ----------------------------<  118<H>  4.4   |  25  |  1.3  07-23    137  |  102  |  15  ----------------------------<  134<H>  4.3   |  24  |  1.1    Ca    7.7<L>      24 Jul 2018 02:00  Mg     2.8     07-24      RADIOLOGY & ADDITIONAL TESTS:  CXR: < from: Xray Chest 1 View- PORTABLE-Routine (07.24.18 @ 06:09) >  Impression:      Stable small left pleural effusion with adjacent left basilar   atelectasis/opacity.    < end of copied text >    EKG:  MEDICATIONS  (STANDING):  ALBUTerol/ipratropium for Nebulization 3 milliLiter(s) Nebulizer every 6 hours  amiodarone    Tablet 200 milliGRAM(s) Oral daily  aspirin enteric coated 325 milliGRAM(s) Oral daily  atorvastatin 40 milliGRAM(s) Oral at bedtime  dextrose 5%. 1000 milliLiter(s) (50 mL/Hr) IV Continuous <Continuous>  dextrose 50% Injectable 50 milliLiter(s) IV Push every 15 minutes  dextrose 50% Injectable 25 milliLiter(s) IV Push every 15 minutes  docusate sodium 100 milliGRAM(s) Oral three times a day  guaiFENesin  milliGRAM(s) Oral every 12 hours  insulin lispro (HumaLOG) corrective regimen sliding scale   SubCutaneous three times a day before meals  magnesium sulfate  IVPB 1 Gram(s) IV Intermittent every 12 hours  metFORMIN 1000 milliGRAM(s) Oral two times a day  metoprolol tartrate 12.5 milliGRAM(s) Oral two times a day  norepinephrine Infusion 0.05 MICROgram(s)/kG/Min (8.916 mL/Hr) IV Continuous <Continuous>  pantoprazole    Tablet 40 milliGRAM(s) Oral before breakfast  sodium chloride 0.9%. 1000 milliLiter(s) (10 mL/Hr) IV Continuous <Continuous>    MEDICATIONS  (PRN):  acetaminophen   Tablet. 650 milliGRAM(s) Oral every 6 hours PRN Moderate Pain (4 - 6)  dextrose 40% Gel 15 Gram(s) Oral once PRN Blood Glucose LESS THAN 70 milliGRAM(s)/deciliter  fentaNYL    Injectable 25 MICROGram(s) IV Push every 1 hour PRN Severe Pain (7 - 10)  glucagon  Injectable 1 milliGRAM(s) IntraMuscular once PRN Glucose LESS THAN 70 milligrams/deciliter  ketorolac   Injectable 15 milliGRAM(s) IV Push every 4 hours PRN Moderate Pain (4 - 6)  oxyCODONE    IR 10 milliGRAM(s) Oral every 4 hours PRN Severe Pain (7 - 10)  oxyCODONE    IR 5 milliGRAM(s) Oral every 4 hours PRN Moderate Pain (4 - 6)  senna 1 Tablet(s) Oral every 12 hours PRN Constipation    HEPARIN:  [] YES [x] NO  Dose: XX UNITS/HR UNITS Q8H  LOVENOX:[] YES [x] NO  Dose: XX mg Q24H  COUMADIN: []  YES [x] NO  Dose: XX mg  Q24H  SCD's: YES b/l  GI Prophylaxis: Protonix [], Pepcid [], None [], (Contra-indication:.....)    Post-Op Beta-Blockers: Yes [x], No[], If No, then contraindication:  Post-Op Aspirin: Yes [x],  No [], If No, then contraindication:  Post-Op Statin: Yes [x], No[], If No, then contraindication:  Allergies    No Known Allergies    Intolerances      Ambulation/Activity Status: ambulate     Assessment/Plan:  62y Male status-post CABGx2 POD#4  - Case and plan discussed with CTU Intensivist and CT Surgeon - Dr. Gatuam   - Continue CTU supportive care    - Continue DVT/GI prophylaxis  - Incentive Spirometry 10 times an hour  - Continue to advance physical activity as tolerated and continue PT/OT as directed  1. CAD: Continue ASA, statin, lopressor 12.5mg bid  2. HTN: lopressor   3. A. Fib: po amio   4. COPD/Hypoxia: nebs, mucinex  5. DM/Glucose Control: start metformin    Social Service Disposition:  home tomorrow

## 2018-07-24 NOTE — DISCHARGE NOTE ADULT - CARE PROVIDER_API CALL
Flo Gautam), Thoracic and Cardiac Surgery  [address] [place] [address]  Phone: [phone]  Fax: [phone]    Joon Hicks), Cardiovascular Disease; Internal Medicine; Interventional Cardiology  [facility] [place] [address]  Phone: [phone]  Fax: [phone]

## 2018-07-29 DIAGNOSIS — I10 ESSENTIAL (PRIMARY) HYPERTENSION: ICD-10-CM

## 2018-07-29 DIAGNOSIS — R09.02 HYPOXEMIA: ICD-10-CM

## 2018-07-29 DIAGNOSIS — E11.9 TYPE 2 DIABETES MELLITUS WITHOUT COMPLICATIONS: ICD-10-CM

## 2018-07-29 DIAGNOSIS — I48.91 UNSPECIFIED ATRIAL FIBRILLATION: ICD-10-CM

## 2018-07-29 DIAGNOSIS — D62 ACUTE POSTHEMORRHAGIC ANEMIA: ICD-10-CM

## 2018-07-29 DIAGNOSIS — J44.9 CHRONIC OBSTRUCTIVE PULMONARY DISEASE, UNSPECIFIED: ICD-10-CM

## 2018-07-29 DIAGNOSIS — Z82.49 FAMILY HISTORY OF ISCHEMIC HEART DISEASE AND OTHER DISEASES OF THE CIRCULATORY SYSTEM: ICD-10-CM

## 2018-07-29 DIAGNOSIS — I25.110 ATHEROSCLEROTIC HEART DISEASE OF NATIVE CORONARY ARTERY WITH UNSTABLE ANGINA PECTORIS: ICD-10-CM

## 2018-07-29 DIAGNOSIS — Z79.82 LONG TERM (CURRENT) USE OF ASPIRIN: ICD-10-CM

## 2018-07-29 DIAGNOSIS — N99.0 POSTPROCEDURAL (ACUTE) (CHRONIC) KIDNEY FAILURE: ICD-10-CM

## 2018-07-29 DIAGNOSIS — E78.5 HYPERLIPIDEMIA, UNSPECIFIED: ICD-10-CM

## 2018-07-29 DIAGNOSIS — I97.190 OTHER POSTPROCEDURAL CARDIAC FUNCTIONAL DISTURBANCES FOLLOWING CARDIAC SURGERY: ICD-10-CM

## 2018-07-29 DIAGNOSIS — I95.81 POSTPROCEDURAL HYPOTENSION: ICD-10-CM

## 2018-07-29 DIAGNOSIS — D69.6 THROMBOCYTOPENIA, UNSPECIFIED: ICD-10-CM

## 2018-07-29 DIAGNOSIS — Z95.5 PRESENCE OF CORONARY ANGIOPLASTY IMPLANT AND GRAFT: ICD-10-CM

## 2018-07-29 DIAGNOSIS — J45.909 UNSPECIFIED ASTHMA, UNCOMPLICATED: ICD-10-CM

## 2020-02-17 ENCOUNTER — INPATIENT (INPATIENT)
Facility: HOSPITAL | Age: 65
LOS: 0 days | Discharge: HOME | End: 2020-02-18
Attending: SPECIALIST | Admitting: SPECIALIST
Payer: COMMERCIAL

## 2020-02-17 ENCOUNTER — RESULT REVIEW (OUTPATIENT)
Age: 65
End: 2020-02-17

## 2020-02-17 VITALS
OXYGEN SATURATION: 99 % | DIASTOLIC BLOOD PRESSURE: 83 MMHG | HEART RATE: 60 BPM | SYSTOLIC BLOOD PRESSURE: 190 MMHG | TEMPERATURE: 96 F | RESPIRATION RATE: 17 BRPM

## 2020-02-17 DIAGNOSIS — Z95.1 PRESENCE OF AORTOCORONARY BYPASS GRAFT: Chronic | ICD-10-CM

## 2020-02-17 LAB
ALBUMIN SERPL ELPH-MCNC: 5 G/DL — SIGNIFICANT CHANGE UP (ref 3.5–5.2)
ALP SERPL-CCNC: 54 U/L — SIGNIFICANT CHANGE UP (ref 30–115)
ALT FLD-CCNC: 30 U/L — SIGNIFICANT CHANGE UP (ref 0–41)
ANION GAP SERPL CALC-SCNC: 18 MMOL/L — HIGH (ref 7–14)
APPEARANCE UR: CLEAR — SIGNIFICANT CHANGE UP
AST SERPL-CCNC: 34 U/L — SIGNIFICANT CHANGE UP (ref 0–41)
BILIRUB DIRECT SERPL-MCNC: <0.2 MG/DL — SIGNIFICANT CHANGE UP (ref 0–0.2)
BILIRUB INDIRECT FLD-MCNC: >0.4 MG/DL — SIGNIFICANT CHANGE UP (ref 0.2–1.2)
BILIRUB SERPL-MCNC: 0.6 MG/DL — SIGNIFICANT CHANGE UP (ref 0.2–1.2)
BILIRUB UR-MCNC: NEGATIVE — SIGNIFICANT CHANGE UP
BUN SERPL-MCNC: 20 MG/DL — SIGNIFICANT CHANGE UP (ref 10–20)
CALCIUM SERPL-MCNC: 10.1 MG/DL — SIGNIFICANT CHANGE UP (ref 8.5–10.1)
CHLORIDE SERPL-SCNC: 99 MMOL/L — SIGNIFICANT CHANGE UP (ref 98–110)
CO2 SERPL-SCNC: 21 MMOL/L — SIGNIFICANT CHANGE UP (ref 17–32)
COLOR SPEC: SIGNIFICANT CHANGE UP
CREAT SERPL-MCNC: 1.4 MG/DL — SIGNIFICANT CHANGE UP (ref 0.7–1.5)
DIFF PNL FLD: NEGATIVE — SIGNIFICANT CHANGE UP
GLUCOSE SERPL-MCNC: 143 MG/DL — HIGH (ref 70–99)
GLUCOSE UR QL: NEGATIVE — SIGNIFICANT CHANGE UP
HCT VFR BLD CALC: 45.1 % — SIGNIFICANT CHANGE UP (ref 42–52)
HGB BLD-MCNC: 16.1 G/DL — SIGNIFICANT CHANGE UP (ref 14–18)
KETONES UR-MCNC: ABNORMAL
LEUKOCYTE ESTERASE UR-ACNC: NEGATIVE — SIGNIFICANT CHANGE UP
LIDOCAIN IGE QN: 38 U/L — SIGNIFICANT CHANGE UP (ref 7–60)
MCHC RBC-ENTMCNC: 32.3 PG — HIGH (ref 27–31)
MCHC RBC-ENTMCNC: 35.7 G/DL — SIGNIFICANT CHANGE UP (ref 32–37)
MCV RBC AUTO: 90.6 FL — SIGNIFICANT CHANGE UP (ref 80–94)
NITRITE UR-MCNC: NEGATIVE — SIGNIFICANT CHANGE UP
NRBC # BLD: 0 /100 WBCS — SIGNIFICANT CHANGE UP (ref 0–0)
PH UR: 6.5 — SIGNIFICANT CHANGE UP (ref 5–8)
PLATELET # BLD AUTO: 187 K/UL — SIGNIFICANT CHANGE UP (ref 130–400)
POTASSIUM SERPL-MCNC: 4.2 MMOL/L — SIGNIFICANT CHANGE UP (ref 3.5–5)
POTASSIUM SERPL-SCNC: 4.2 MMOL/L — SIGNIFICANT CHANGE UP (ref 3.5–5)
PROT SERPL-MCNC: 7.7 G/DL — SIGNIFICANT CHANGE UP (ref 6–8)
PROT UR-MCNC: SIGNIFICANT CHANGE UP
RBC # BLD: 4.98 M/UL — SIGNIFICANT CHANGE UP (ref 4.7–6.1)
RBC # FLD: 12.8 % — SIGNIFICANT CHANGE UP (ref 11.5–14.5)
SODIUM SERPL-SCNC: 138 MMOL/L — SIGNIFICANT CHANGE UP (ref 135–146)
SP GR SPEC: 1.04 — HIGH (ref 1.01–1.02)
TROPONIN T SERPL-MCNC: <0.01 NG/ML — SIGNIFICANT CHANGE UP
UROBILINOGEN FLD QL: SIGNIFICANT CHANGE UP
WBC # BLD: 9.4 K/UL — SIGNIFICANT CHANGE UP (ref 4.8–10.8)
WBC # FLD AUTO: 9.4 K/UL — SIGNIFICANT CHANGE UP (ref 4.8–10.8)

## 2020-02-17 PROCEDURE — 76705 ECHO EXAM OF ABDOMEN: CPT | Mod: 26

## 2020-02-17 PROCEDURE — 88304 TISSUE EXAM BY PATHOLOGIST: CPT | Mod: 26

## 2020-02-17 PROCEDURE — 93010 ELECTROCARDIOGRAM REPORT: CPT

## 2020-02-17 PROCEDURE — 71045 X-RAY EXAM CHEST 1 VIEW: CPT | Mod: 26

## 2020-02-17 PROCEDURE — 74177 CT ABD & PELVIS W/CONTRAST: CPT | Mod: 26

## 2020-02-17 PROCEDURE — 99285 EMERGENCY DEPT VISIT HI MDM: CPT

## 2020-02-17 RX ORDER — HYDROMORPHONE HYDROCHLORIDE 2 MG/ML
0.5 INJECTION INTRAMUSCULAR; INTRAVENOUS; SUBCUTANEOUS
Refills: 0 | Status: DISCONTINUED | OUTPATIENT
Start: 2020-02-17 | End: 2020-02-18

## 2020-02-17 RX ORDER — FAMOTIDINE 10 MG/ML
20 INJECTION INTRAVENOUS ONCE
Refills: 0 | Status: COMPLETED | OUTPATIENT
Start: 2020-02-17 | End: 2020-02-17

## 2020-02-17 RX ORDER — PANTOPRAZOLE SODIUM 20 MG/1
40 TABLET, DELAYED RELEASE ORAL
Refills: 0 | Status: DISCONTINUED | OUTPATIENT
Start: 2020-02-17 | End: 2020-02-18

## 2020-02-17 RX ORDER — SODIUM CHLORIDE 9 MG/ML
1000 INJECTION, SOLUTION INTRAVENOUS
Refills: 0 | Status: DISCONTINUED | OUTPATIENT
Start: 2020-02-17 | End: 2020-02-18

## 2020-02-17 RX ORDER — MORPHINE SULFATE 50 MG/1
4 CAPSULE, EXTENDED RELEASE ORAL ONCE
Refills: 0 | Status: DISCONTINUED | OUTPATIENT
Start: 2020-02-17 | End: 2020-02-17

## 2020-02-17 RX ORDER — HEPARIN SODIUM 5000 [USP'U]/ML
5000 INJECTION INTRAVENOUS; SUBCUTANEOUS EVERY 8 HOURS
Refills: 0 | Status: DISCONTINUED | OUTPATIENT
Start: 2020-02-17 | End: 2020-02-18

## 2020-02-17 RX ORDER — ACETAMINOPHEN 500 MG
650 TABLET ORAL EVERY 6 HOURS
Refills: 0 | Status: DISCONTINUED | OUTPATIENT
Start: 2020-02-17 | End: 2020-02-18

## 2020-02-17 RX ORDER — OXYCODONE HYDROCHLORIDE 5 MG/1
5 TABLET ORAL ONCE
Refills: 0 | Status: DISCONTINUED | OUTPATIENT
Start: 2020-02-17 | End: 2020-02-18

## 2020-02-17 RX ORDER — IBUPROFEN 200 MG
600 TABLET ORAL EVERY 6 HOURS
Refills: 0 | Status: DISCONTINUED | OUTPATIENT
Start: 2020-02-17 | End: 2020-02-18

## 2020-02-17 RX ORDER — SODIUM CHLORIDE 9 MG/ML
1000 INJECTION INTRAMUSCULAR; INTRAVENOUS; SUBCUTANEOUS
Refills: 0 | Status: DISCONTINUED | OUTPATIENT
Start: 2020-02-17 | End: 2020-02-17

## 2020-02-17 RX ORDER — MORPHINE SULFATE 50 MG/1
6 CAPSULE, EXTENDED RELEASE ORAL ONCE
Refills: 0 | Status: DISCONTINUED | OUTPATIENT
Start: 2020-02-17 | End: 2020-02-17

## 2020-02-17 RX ORDER — ASPIRIN/CALCIUM CARB/MAGNESIUM 324 MG
81 TABLET ORAL DAILY
Refills: 0 | Status: DISCONTINUED | OUTPATIENT
Start: 2020-02-17 | End: 2020-02-18

## 2020-02-17 RX ORDER — METOCLOPRAMIDE HCL 10 MG
10 TABLET ORAL ONCE
Refills: 0 | Status: DISCONTINUED | OUTPATIENT
Start: 2020-02-17 | End: 2020-02-18

## 2020-02-17 RX ORDER — SODIUM CHLORIDE 9 MG/ML
1000 INJECTION INTRAMUSCULAR; INTRAVENOUS; SUBCUTANEOUS ONCE
Refills: 0 | Status: COMPLETED | OUTPATIENT
Start: 2020-02-17 | End: 2020-02-17

## 2020-02-17 RX ORDER — ONDANSETRON 8 MG/1
4 TABLET, FILM COATED ORAL ONCE
Refills: 0 | Status: COMPLETED | OUTPATIENT
Start: 2020-02-17 | End: 2020-02-17

## 2020-02-17 RX ORDER — OXYCODONE HYDROCHLORIDE 5 MG/1
5 TABLET ORAL EVERY 6 HOURS
Refills: 0 | Status: DISCONTINUED | OUTPATIENT
Start: 2020-02-17 | End: 2020-02-18

## 2020-02-17 RX ADMIN — FAMOTIDINE 20 MILLIGRAM(S): 10 INJECTION INTRAVENOUS at 17:41

## 2020-02-17 RX ADMIN — SODIUM CHLORIDE 150 MILLILITER(S): 9 INJECTION, SOLUTION INTRAVENOUS at 21:43

## 2020-02-17 RX ADMIN — SODIUM CHLORIDE 100 MILLILITER(S): 9 INJECTION, SOLUTION INTRAVENOUS at 23:59

## 2020-02-17 RX ADMIN — SODIUM CHLORIDE 125 MILLILITER(S): 9 INJECTION INTRAMUSCULAR; INTRAVENOUS; SUBCUTANEOUS at 16:46

## 2020-02-17 RX ADMIN — MORPHINE SULFATE 4 MILLIGRAM(S): 50 CAPSULE, EXTENDED RELEASE ORAL at 16:46

## 2020-02-17 RX ADMIN — SODIUM CHLORIDE 1000 MILLILITER(S): 9 INJECTION INTRAMUSCULAR; INTRAVENOUS; SUBCUTANEOUS at 19:24

## 2020-02-17 RX ADMIN — MORPHINE SULFATE 6 MILLIGRAM(S): 50 CAPSULE, EXTENDED RELEASE ORAL at 19:26

## 2020-02-17 RX ADMIN — MORPHINE SULFATE 4 MILLIGRAM(S): 50 CAPSULE, EXTENDED RELEASE ORAL at 17:25

## 2020-02-17 RX ADMIN — Medication 650 MILLIGRAM(S): at 23:58

## 2020-02-17 RX ADMIN — ONDANSETRON 104 MILLIGRAM(S): 8 TABLET, FILM COATED ORAL at 16:46

## 2020-02-17 RX ADMIN — Medication 650 MILLIGRAM(S): at 23:59

## 2020-02-17 RX ADMIN — HEPARIN SODIUM 5000 UNIT(S): 5000 INJECTION INTRAVENOUS; SUBCUTANEOUS at 22:47

## 2020-02-17 NOTE — ED PROVIDER NOTE - PHYSICAL EXAMINATION
PHYSICAL EXAM: I have reviewed current vital signs.  GENERAL: NAD, well-nourished; well-developed.  HEAD:  Normocephalic, atraumatic.  EYES: EOMI, PERRL, conjunctiva and sclera clear.  ENT: MMM, no erythema/exudates.  NECK: Supple, FROM.  CHEST/LUNG: Clear to auscultation bilaterally; no wheezes, rales, or rhonchi.  HEART: Regular rate and rhythm, normal S1 and S2; no murmurs, rubs, or gallops.  ABDOMEN: Soft, moderate RUQ TTP, no peritoneal signs, no guarding/rebound TTP.  EXTREMITIES:  2+ peripheral pulses; FROM.  NEUROLOGY: A&O x 3. Motor 5/5. No focal neurological deficits.  SKIN: Warm and dry.

## 2020-02-17 NOTE — ED PROVIDER NOTE - NS ED ROS FT
Constitutional:  No fevers or chills.  Eyes:  No visual changes, eye pain, or discharge.  ENT:  No hearing changes. No sore throat.  Neck:  No neck pain.  Cardiac:  No CP.  Resp:  No cough or SOB.  GI:  +Abd pain and nausea. No vomiting/diarrhea.  :  No dysuria, frequency, or hematuria.  MSK:  No myalgias or joint pain/swelling.  Neuro:  No headache, dizziness, or weakness.  Skin:  No skin rash.

## 2020-02-17 NOTE — H&P ADULT - HISTORY OF PRESENT ILLNESS
General Surgery  =====================================================    HPI:   64y Male PMH as below, significant for CAD s/p CABG, HTN, HLD, presents with 1 day Epigastric and RUQ abdominal pain radiating to the back with associated nausea no vomiting, afebrile, no jaundice. No prior history of biliary colic or similar complaints.     PAST MEDICAL & SURGICAL HISTORY:  CAD (coronary artery disease)  S/P CABG (coronary artery bypass graft)    Home Meds: Home Medications:  acetaminophen 325 mg oral tablet: 2 tab(s) orally every 6 hours, As needed, Moderate Pain (4 - 6) (24 Jul 2018 16:20)  Crestor 40 mg oral tablet: 1 tab(s) orally once a day (at bedtime) (20 Jul 2018 07:05)    Allergies: Allergies  No Known Allergies  Intolerances

## 2020-02-17 NOTE — H&P ADULT - NSHPPHYSICALEXAM_GEN_ALL_CORE
--------------------------------------------------------------------------------------  VITAL SIGNS, INS/OUTS (last 24 hours):  --------------------------------------------------------------------------------------  ICU Vital Signs Last 24 Hrs  T(C): 35.6 (17 Feb 2020 16:25), Max: 35.6 (17 Feb 2020 16:25)  T(F): 96 (17 Feb 2020 16:25), Max: 96 (17 Feb 2020 16:25)  HR: 60 (17 Feb 2020 16:25) (60 - 60)  BP: 190/83 (17 Feb 2020 16:25) (190/83 - 190/83)  RR: 17 (17 Feb 2020 16:25) (17 - 17)  SpO2: 99% (17 Feb 2020 16:25) (99% - 99%)  --------------------------------------------------------------------------------------  PHYSICAL EXAM  General: NAD, AAOx3, calm and cooperative  HEENT: NCAT, CHRIS, EOMI, Trachea ML, Neck supple  Cardiac: RRR S1, S2  Respiratory: CTAB, normal respiratory effort  Abdomen: Soft, non-distended, +RUQ tenderness, no Lemos sign, +bowel sounds  Skin: Warm/dry, normal color, no jaundice

## 2020-02-17 NOTE — ED PROVIDER NOTE - PROGRESS NOTE DETAILS
case endorsed to Dr. Palacio. Pt in CT now Patient found to have enlarged GB/pericholecystic fluid on CT scan, will admit to surgery for further management. Dr. Berry does not want antibiotics at this time. Will admit to Dr. Sandhu. accepting s/o from Dr. cohen wrap up note: pt presented for acute upper epigastric pain, labs wnl, ct showing pericholcystic fluid and gallbladder distension. seen by surgery. admit will take to OR. RUQ sono ordered, obtained and surgery will f/u spoke to [place] surgical resident about sono report and nodularity and to send gallbladder for formal pathological analysis. will tell primary team

## 2020-02-17 NOTE — ED PROVIDER NOTE - NS ED ATTENDING STATEMENT MOD
I have personally seen and examined this patient.  I have fully participated in the care of this patient. I have reviewed all pertinent clinical information, including history, physical exam, plan and the Resident’s note and agree except as noted.
18-Jun-2019

## 2020-02-17 NOTE — ED PROVIDER NOTE - ATTENDING CONTRIBUTION TO CARE
65 yo M with hx of CAD/CABG, HTN here with 1-2 hours of acute RUQ pain associated with nausea. Pain radiates to the back. Neer had this before. Pain constant since started. No CP or SOB. No fever. On exam pt awake , alert, appropriate, s1s2  RRR, 2+ distal pulses, + RUQ. bed side sono GB without thickening or stones. P: labs UA CT and reeval

## 2020-02-17 NOTE — H&P ADULT - ASSESSMENT
ASSESSMENT:  64y Male presenting with 1 day of acute onset epigastric and RUQ abdominal pain radiating to the back with associated nausea, Afebrile, tender in the RUQ on exam no Lemos sign, no leukocytosis or LFT derangement, imaging with CT A/P and US RUQ demonstrates distended, edematous gallbladder with mild inflammatory changes.     PLAN:   NPO  IV Fluid, BL IV access, 18Ga, Resuscitate  Pre-op Labs: CBC, BMP, Mg, PO  T+S, Coags  EKG, CXR  Pain control, IV morphine PRN  Strict I+O  DVT/GI ppx  Pre-op for laparoscopic cholecystectomy, possible open procedure  Consent signed in chart  Case added to OR schedule    Senior Surgical Resident Note  I have edited the above note and agree with the current treatment plan  Above plan was discussed with Dr. Sandhu, patient, patient's family present at bedside, and the ED team  ---------------------------------------------------------------------------------------  02-17-20 @ 19:45  --------------------------------------------------------------------------------------    02-17-20 @ 19:42

## 2020-02-17 NOTE — H&P ADULT - NSHPLABSRESULTS_GEN_ALL_CORE
LABS  --------------------------------------------------------------------------------------  CAPILLARY BLOOD GLUCOSE                        16.1   9.40  )-----------( 187      ( 2020 16:45 )             45.1         138  |  99  |  20  ----------------------------<  143<H>  4.2   |  21  |  1.4    Calcium, Total Serum: 10.1 mg/dL (20 @ 16:45)    LFTs:           7.7  | 0.6  | 34       ------------------[54      ( 2020 16:45 )  5.0  | <0.2 | 30          Lipase:38     Amylase:x        Coags:    CARDIAC MARKERS ( 2020 16:45 )  x     / <0.01 ng/mL / x     / x     / x        Urinalysis Basic - ( 2020 19:25 )  Color: Light Yellow / Appearance: Clear / S.039 / pH: x  Gluc: x / Ketone: Moderate  / Bili: Negative / Urobili: <2 mg/dL   Blood: x / Protein: Trace / Nitrite: Negative   Leuk Esterase: Negative / RBC: x / WBC x   Sq Epi: x / Non Sq Epi: x / Bacteria: x  --------------------------------------------------------------------------------------  IMAGING RESULTS  < from: CT Abdomen and Pelvis w/ IV Cont (20 @ 17:13) >  IMPRESSION:   Gallbladder distention with a small amount of pericholecystic fluid.   Findings may represent acute cholecystitis.   However, further evaluation with right upper quadrant ultrasound is necessary for complete characterization.  < end of copied text >    US pending read...  ---------------------------------------------------------------------------------------

## 2020-02-17 NOTE — ED PROVIDER NOTE - OBJECTIVE STATEMENT
65yo M with PMH of CAD/CABG and HTN presenting to ED with 1-2 hours of acute onset RUQ abdominal pain with nausea, +pain radiation into back, constant, moderate to severe, no exacerbating/relieving factors. Denies any f/c, CP, SOB, urinary sxs, testicular pain, injuries/traumas/falls, v/d, bloody stool, or other complaints. No hx of GB problems in the past. Nonsmoker.

## 2020-02-18 ENCOUNTER — TRANSCRIPTION ENCOUNTER (OUTPATIENT)
Age: 65
End: 2020-02-18

## 2020-02-18 VITALS
DIASTOLIC BLOOD PRESSURE: 59 MMHG | TEMPERATURE: 96 F | RESPIRATION RATE: 20 BRPM | HEART RATE: 73 BPM | SYSTOLIC BLOOD PRESSURE: 122 MMHG

## 2020-02-18 RX ORDER — IBUPROFEN 200 MG
1 TABLET ORAL
Qty: 0 | Refills: 0 | DISCHARGE
Start: 2020-02-18

## 2020-02-18 RX ORDER — OXYCODONE HYDROCHLORIDE 5 MG/1
1 TABLET ORAL
Qty: 5 | Refills: 0
Start: 2020-02-18

## 2020-02-18 RX ADMIN — Medication 650 MILLIGRAM(S): at 10:24

## 2020-02-18 RX ADMIN — Medication 650 MILLIGRAM(S): at 05:51

## 2020-02-18 RX ADMIN — PANTOPRAZOLE SODIUM 40 MILLIGRAM(S): 20 TABLET, DELAYED RELEASE ORAL at 05:51

## 2020-02-18 RX ADMIN — Medication 650 MILLIGRAM(S): at 10:23

## 2020-02-18 RX ADMIN — Medication 650 MILLIGRAM(S): at 05:53

## 2020-02-18 RX ADMIN — HEPARIN SODIUM 5000 UNIT(S): 5000 INJECTION INTRAVENOUS; SUBCUTANEOUS at 05:52

## 2020-02-18 NOTE — DISCHARGE NOTE PROVIDER - CARE PROVIDER_API CALL
Osmar Sandhu)  Surgery; Surgical Critical Care  [address] [place] [address]  Phone: [phone]  Fax: [phone]  Follow Up Time:

## 2020-02-18 NOTE — DISCHARGE NOTE NURSING/CASE MANAGEMENT/SOCIAL WORK - PATIENT PORTAL LINK FT
You can access the FollowMyHealth Patient Portal offered by [facility] by registering at the following website: [email or web address]. By joining [employer]’s FollowMyHealth portal, you will also be able to view your health information using other applications (apps) compatible with our system.

## 2020-02-18 NOTE — DISCHARGE NOTE PROVIDER - HOSPITAL COURSE
64y Male presenting with 1 day of acute onset epigastric and RUQ abdominal pain radiating to the back with associated nausea, Afebrile, tender in the RUQ on exam no Lemos sign, no leukocytosis or LFT derangement, imaging with CT A/P and US RUQ demonstrates distended, edematous gallbladder with mild inflammatory changes. Patient is now POD 1 s/p laparoscopic cholecystectomy finding an inflamed, thickened gallbladder with omental adhesions. Patient tolerated the procedure well with no perioperative complications. Patient is hemodynamically stable within normal limits, tolerating PO diet, ambulating normally, voiding normally, pain is controlled and is ready for discharge home.

## 2020-02-18 NOTE — DISCHARGE NOTE PROVIDER - NSDCCPTREATMENT_GEN_ALL_CORE_FT
PRINCIPAL PROCEDURE  Procedure: Laparoscopic cholecystectomy  Findings and Treatment: You are being discharged from [facility]. Please call to schedule a follow up appointment with Dr. Sandhu as previously discussed/in 1 week. You have been prescribed pain medications and your home medications, please take as directed. Please dp not drive while under the influence of narcotics. You may remove your dressing 48 hours. You may shower, allowing the water to run over your wounds, but do not submerge in a water bath. Please avoid heavy weight lifting for the next 4-6 weeks.  If you have any further questions about your care, please do not hesitate to contact Dr. Sandhu's office or return to the Emergency Department.

## 2020-02-18 NOTE — PROGRESS NOTE ADULT - ASSESSMENT
64y Male presenting with 1 day of acute onset epigastric and RUQ abdominal pain radiating to the back with associated nausea, Afebrile, tender in the RUQ on exam no Lemos sign, no leukocytosis or LFT derangement, imaging with CT A/P and US RUQ demonstrates distended, edematous gallbladder with mild inflammatory changes. Now s/p laparoscopic cholecystectomy finding an inflamed thickened gallbladder Patient is currently sleeping comfortably in bed, with no complaints, pain is controlled.     PLAN:   CLD  IVF  Pain control  DVT/GI ppx  Ambulate as tolerated

## 2020-02-18 NOTE — PROGRESS NOTE ADULT - SUBJECTIVE AND OBJECTIVE BOX
GENERAL SURGERY PROGRESS NOTE     RONALD MAHER  64y  Male  Hospital day :1d  POD:  Procedure: Laparoscopic cholecystectomy    OVERNIGHT EVENTS: Uneventful.     T(F): 96.3 (20 @ 23:42), Max: 98.2 (20 @ 22:43)  HR: 70 (20 @ 23:42) (60 - 88)  BP: 141/71 (20 @ 23:42) (140/65 - 190/83)  ABP: --  ABP(mean): --  RR: 18 (20 @ 23:42) (15 - 18)  SpO2: 97% (20 @ 23:35) (97% - 100%)    DIET/FLUIDS: lactated ringers. 1000 milliLiter(s) IV Continuous <Continuous>    NG:                                                                                DRAINS:     BM:     EMESIS:     URINE:      GI proph:  pantoprazole    Tablet 40 milliGRAM(s) Oral before breakfast    AC/ proph: aspirin enteric coated 81 milliGRAM(s) Oral daily  heparin  Injectable 5000 Unit(s) SubCutaneous every 8 hours    ABx:     PHYSICAL EXAM:  GENERAL: NAD, well-appearing  CHEST/LUNG: Clear to auscultation bilaterally  HEART: Regular rate and rhythm  ABDOMEN: Soft, Nontender, Nondistended; dressings clean/dry/intact  EXTREMITIES:  No clubbing, cyanosis, or edema      LABS  Labs:  CAPILLARY BLOOD GLUCOSE                              16.1   9.40  )-----------( 187      ( 2020 16:45 )             45.1             138  |  99  |  20  ----------------------------<  143<H>  4.2   |  21  |  1.4      Calcium, Total Serum: 10.1 mg/dL (20 @ 16:45)      LFTs:             7.7  | 0.6  | 34       ------------------[54      ( 2020 16:45 )  5.0  | <0.2 | 30          Lipase:38     Amylase:x             Coags:    CARDIAC MARKERS ( 2020 16:45 )  x     / <0.01 ng/mL / x     / x     / x              Urinalysis Basic - ( 2020 19:25 )    Color: Light Yellow / Appearance: Clear / S.039 / pH: x  Gluc: x / Ketone: Moderate  / Bili: Negative / Urobili: <2 mg/dL   Blood: x / Protein: Trace / Nitrite: Negative   Leuk Esterase: Negative / RBC: x / WBC x   Sq Epi: x / Non Sq Epi: x / Bacteria: x            RADIOLOGY & ADDITIONAL TESTS:      No new studies

## 2020-02-18 NOTE — DISCHARGE NOTE PROVIDER - NSDCMRMEDTOKEN_GEN_ALL_CORE_FT
acetaminophen 325 mg oral tablet: 2 tab(s) orally every 6 hours, As needed, Moderate Pain (4 - 6)  amiodarone 200 mg oral tablet: 1 tab(s) orally once a day  aspirin 325 mg oral delayed release tablet: 1 tab(s) orally once a day  Crestor 40 mg oral tablet: 1 tab(s) orally once a day (at bedtime)  docusate sodium 100 mg oral capsule: 1 cap(s) orally 3 times a day  ibuprofen 600 mg oral tablet: 1 tab(s) orally every 6 hours  metFORMIN 1000 mg oral tablet: 1 tab(s) orally 2 times a day  metoprolol tartrate 25 mg oral tablet: 0.5 tab(s) orally 2 times a day   oxyCODONE 5 mg oral tablet: 1 tab(s) orally every 6 hours, As needed, Moderate Pain (4 - 6) MDD:4 tablets  pantoprazole 40 mg oral delayed release tablet: 1 tab(s) orally once a day (before a meal)

## 2020-02-20 LAB — SURGICAL PATHOLOGY STUDY: SIGNIFICANT CHANGE UP

## 2020-02-21 DIAGNOSIS — Z79.82 LONG TERM (CURRENT) USE OF ASPIRIN: ICD-10-CM

## 2020-02-21 DIAGNOSIS — I25.10 ATHEROSCLEROTIC HEART DISEASE OF NATIVE CORONARY ARTERY WITHOUT ANGINA PECTORIS: ICD-10-CM

## 2020-02-21 DIAGNOSIS — Z95.1 PRESENCE OF AORTOCORONARY BYPASS GRAFT: ICD-10-CM

## 2020-02-21 DIAGNOSIS — E78.5 HYPERLIPIDEMIA, UNSPECIFIED: ICD-10-CM

## 2020-02-21 DIAGNOSIS — K80.00 CALCULUS OF GALLBLADDER WITH ACUTE CHOLECYSTITIS WITHOUT OBSTRUCTION: ICD-10-CM

## 2020-03-27 ENCOUNTER — LABORATORY RESULT (OUTPATIENT)
Age: 65
End: 2020-03-27

## 2020-03-31 ENCOUNTER — APPOINTMENT (OUTPATIENT)
Dept: UROLOGY | Facility: CLINIC | Age: 65
End: 2020-03-31
Payer: COMMERCIAL

## 2020-03-31 ENCOUNTER — OUTPATIENT (OUTPATIENT)
Dept: OUTPATIENT SERVICES | Facility: HOSPITAL | Age: 65
LOS: 1 days | Discharge: HOME | End: 2020-03-31
Payer: COMMERCIAL

## 2020-03-31 VITALS
SYSTOLIC BLOOD PRESSURE: 140 MMHG | BODY MASS INDEX: 30.31 KG/M2 | HEIGHT: 68 IN | WEIGHT: 200 LBS | TEMPERATURE: 96.8 F | DIASTOLIC BLOOD PRESSURE: 86 MMHG | HEART RATE: 69 BPM

## 2020-03-31 DIAGNOSIS — Z80.9 FAMILY HISTORY OF MALIGNANT NEOPLASM, UNSPECIFIED: ICD-10-CM

## 2020-03-31 DIAGNOSIS — N50.812 LEFT TESTICULAR PAIN: ICD-10-CM

## 2020-03-31 DIAGNOSIS — Z78.9 OTHER SPECIFIED HEALTH STATUS: ICD-10-CM

## 2020-03-31 DIAGNOSIS — N43.3 HYDROCELE, UNSPECIFIED: ICD-10-CM

## 2020-03-31 DIAGNOSIS — Z95.1 PRESENCE OF AORTOCORONARY BYPASS GRAFT: Chronic | ICD-10-CM

## 2020-03-31 DIAGNOSIS — Z87.19 PERSONAL HISTORY OF OTHER DISEASES OF THE DIGESTIVE SYSTEM: ICD-10-CM

## 2020-03-31 PROBLEM — I25.10 ATHEROSCLEROTIC HEART DISEASE OF NATIVE CORONARY ARTERY WITHOUT ANGINA PECTORIS: Chronic | Status: ACTIVE | Noted: 2020-02-17

## 2020-03-31 PROCEDURE — 76870 US EXAM SCROTUM: CPT | Mod: 26

## 2020-03-31 PROCEDURE — 99203 OFFICE O/P NEW LOW 30 MIN: CPT

## 2020-03-31 RX ORDER — CIPROFLOXACIN HYDROCHLORIDE 500 MG/1
500 TABLET, FILM COATED ORAL
Qty: 20 | Refills: 0 | Status: ACTIVE | COMMUNITY
Start: 2020-02-18

## 2020-03-31 RX ORDER — OXYCODONE 5 MG/1
5 TABLET ORAL
Qty: 5 | Refills: 0 | Status: ACTIVE | COMMUNITY
Start: 2020-02-18

## 2020-03-31 NOTE — REVIEW OF SYSTEMS
[Fever] : no fever [Chest Pain] : no chest pain [Shortness Of Breath] : no shortness of breath [Cough] : no cough [Constipation] : no constipation [Diarrhea] : no diarrhea [Dysuria] : no dysuria [Confused] : no confusion

## 2020-03-31 NOTE — PHYSICAL EXAM
[General Appearance - In No Acute Distress] : no acute distress [Edema] : no peripheral edema [Costovertebral Angle Tenderness] : no ~M costovertebral angle tenderness [Urethral Meatus] : meatus normal [Prostate Tenderness] : the prostate was not tender [No Prostate Nodules] : no prostate nodules [Prostate Size ___ (0-4)] : prostate size [unfilled] (scale: 0-4) [FreeTextEntry1] : Bilateral scrotal swelling making difficult to evaluate testicles consistent with hydroceles [Normal Station and Gait] : the gait and station were normal for the patient's age [Oriented To Time, Place, And Person] : oriented to person, place, and time

## 2020-03-31 NOTE — HISTORY OF PRESENT ILLNESS
[FreeTextEntry1] : 64-year-old with scrotal swelling for one year. It began after coronary bypass surgery and has persisted. There is no pain. He does have a history of inguinal hernias. There are no urinary complaints and no bowel complaints [Urinary Incontinence] : no urinary incontinence [Urinary Retention] : no urinary retention [Urinary Urgency] : no urinary urgency [Urinary Frequency] : no urinary frequency [Nocturia] : no nocturia [Straining] : no straining [Weak Stream] : no weak stream [Intermittency] : no intermittency [Dysuria] : no dysuria [Hematuria - Gross] : no gross hematuria [Abdominal Pain] : no abdominal pain [Flank Pain] : no flank pain

## 2020-03-31 NOTE — ASSESSMENT
[Hydrocele of Testis (603.9\N43.3)] : ~T synovium and tendon of elbow [FreeTextEntry1] : Likely bilateral hydroceles but need to confirm with scrotal ultrasound and also ultrasound Will better evaluate testicles themselves.

## 2020-04-06 NOTE — ED ADULT NURSE NOTE - CAS EDN DISCHARGE ASSESSMENT
Ventricular Rate : 75  Atrial Rate : 75  P-R Interval : 140  QRS Duration : 82  Q-T Interval : 400  QTC Calculation(Bazett) : 446  P Axis : 79  R Axis : 81  T Axis : -46  Diagnosis : Normal sinus rhythm  Nonspecific T wave abnormality  Abnormal ECG  When compared with ECG of 01-FEB-2018 18:47,  No significant change was found  Confirmed by Arron Butcher ([id number]) on 4/6/2020 2:54:08 PM   Alert and oriented to person, place and time

## 2020-04-16 ENCOUNTER — EMERGENCY (EMERGENCY)
Facility: HOSPITAL | Age: 65
LOS: 0 days | Discharge: HOME | End: 2020-04-16
Admitting: EMERGENCY MEDICINE
Payer: COMMERCIAL

## 2020-04-16 VITALS
OXYGEN SATURATION: 100 % | TEMPERATURE: 98 F | DIASTOLIC BLOOD PRESSURE: 81 MMHG | HEART RATE: 77 BPM | RESPIRATION RATE: 20 BRPM | SYSTOLIC BLOOD PRESSURE: 153 MMHG

## 2020-04-16 DIAGNOSIS — Z95.1 PRESENCE OF AORTOCORONARY BYPASS GRAFT: Chronic | ICD-10-CM

## 2020-04-16 DIAGNOSIS — R50.9 FEVER, UNSPECIFIED: ICD-10-CM

## 2020-04-16 PROCEDURE — 99284 EMERGENCY DEPT VISIT MOD MDM: CPT

## 2020-04-16 NOTE — ED PROVIDER NOTE - CLINICAL SUMMARY MEDICAL DECISION MAKING FREE TEXT BOX
Pt with flu like sx. Healthcare provider. Normal VS and exam. Will test for Covid and give Covid dc precautions

## 2020-04-16 NOTE — ED PROVIDER NOTE - PATIENT PORTAL LINK FT
You can access the FollowMyHealth Patient Portal offered by [facility] by registering at the following website: [email or web address]. By joining [employer]’s FollowMyHealth portal, you will also be able to view your health information using other applications (apps) compatible with our system.

## 2020-04-16 NOTE — ED PROVIDER NOTE - NS ED ROS FT
EYES: No pain, redness, drainage or visual changes.  ENT: No ear pain or discharge, nasal discharge or congestion. No sore throat  CARD: No chest pain, palpitations  RESP: No SOB, cough, hemoptysis. No hx of asthma or COPD  GI: No abdominal pain, N/V/D  MS: No joint pain, back pain or extremity pain/injury  SKIN: No rashes  NEURO: No headache, dizziness, paresthesias or LOC

## 2020-04-16 NOTE — ED PROVIDER NOTE - NSFOLLOWUPINSTRUCTIONS_ED_ALL_ED_FT
You are being discharged with viral illness diagnosis and do not require hospitalization.  At this time, only patients who are being hospitalized are tested for COVID-19.    If you are well enough to be discharged home and are not in a high risk group to be admitted, you should care for yourself at home exactly like you would if you have Influenza “flu”. Follow all the standard guidelines about washing your hands, covering your cough, etc. If you feel unwell, stay home, rest and drink plenty of clear fluids. Keep track of your symptoms. You should return to the Emergency Department if you develop worse symptoms, trouble breathing, chest pain, and/or a fever that doesn’t improve with over the counter medications.    Please consider going through the drive-through testing unless you are severely ill and need to go to the ED.  -through testing is available at various location, including [place].  Call [facility] at  [phone] to make an appointment.    How to Set Up Your Home for Self-Quarantine or Self-Isolation    Please refer to this helpful video.   [email or web address]

## 2020-04-17 LAB — SARS-COV-2 RNA SPEC QL NAA+PROBE: SIGNIFICANT CHANGE UP

## 2020-04-20 ENCOUNTER — LABORATORY RESULT (OUTPATIENT)
Age: 65
End: 2020-04-20

## 2020-04-22 ENCOUNTER — OUTPATIENT (OUTPATIENT)
Dept: OUTPATIENT SERVICES | Facility: HOSPITAL | Age: 65
LOS: 1 days | Discharge: HOME | End: 2020-04-22
Payer: COMMERCIAL

## 2020-04-22 ENCOUNTER — RESULT REVIEW (OUTPATIENT)
Age: 65
End: 2020-04-22

## 2020-04-22 DIAGNOSIS — Z95.1 PRESENCE OF AORTOCORONARY BYPASS GRAFT: Chronic | ICD-10-CM

## 2020-04-22 DIAGNOSIS — K80.43 CALCULUS OF BILE DUCT WITH ACUTE CHOLECYSTITIS WITH OBSTRUCTION: ICD-10-CM

## 2020-04-22 PROCEDURE — 74181 MRI ABDOMEN W/O CONTRAST: CPT | Mod: 26

## 2020-04-25 ENCOUNTER — MESSAGE (OUTPATIENT)
Age: 65
End: 2020-04-25

## 2020-05-01 ENCOUNTER — LABORATORY RESULT (OUTPATIENT)
Age: 65
End: 2020-05-01

## 2020-05-02 LAB
SARS-COV-2 IGG SERPL IA-ACNC: 0.3 INDEX
SARS-COV-2 IGG SERPL QL IA: NEGATIVE

## 2020-10-05 DIAGNOSIS — E78.5 HYPERLIPIDEMIA, UNSPECIFIED: ICD-10-CM

## 2020-12-03 DIAGNOSIS — I10 ESSENTIAL (PRIMARY) HYPERTENSION: ICD-10-CM

## 2021-02-24 RX ORDER — ACETAZOLAMIDE 250 MG/1
250 TABLET ORAL TWICE DAILY
Qty: 20 | Refills: 0 | Status: ACTIVE | COMMUNITY
Start: 2021-02-24 | End: 1900-01-01

## 2021-02-24 RX ORDER — AMOXICILLIN AND CLAVULANATE POTASSIUM 500; 125 MG/1; MG/1
500-125 TABLET, FILM COATED ORAL
Qty: 20 | Refills: 0 | Status: ACTIVE | COMMUNITY
Start: 2021-02-24 | End: 1900-01-01

## 2021-03-10 NOTE — ED ADULT NURSE NOTE - NS ED NOTE ABUSE RESPONSE YN
Created and sent result letter to patient via the online patient portal conveying negative urine culture and vaginal pathogen panel results. Patient last logged onto the patient portal today.
Yes

## 2022-01-06 ENCOUNTER — LABORATORY RESULT (OUTPATIENT)
Age: 67
End: 2022-01-06

## 2022-02-01 ENCOUNTER — RX RENEWAL (OUTPATIENT)
Age: 67
End: 2022-02-01

## 2022-03-18 ENCOUNTER — RX RENEWAL (OUTPATIENT)
Age: 67
End: 2022-03-18

## 2022-11-10 ENCOUNTER — APPOINTMENT (OUTPATIENT)
Dept: SURGERY | Facility: CLINIC | Age: 67
End: 2022-11-10

## 2022-11-10 VITALS — WEIGHT: 203 LBS | HEIGHT: 68 IN | BODY MASS INDEX: 30.77 KG/M2

## 2022-11-10 DIAGNOSIS — K40.90 UNILATERAL INGUINAL HERNIA, W/OUT OBSTRUCTION OR GANGRENE, NOT SPECIFIED AS RECURRENT: ICD-10-CM

## 2022-11-10 PROCEDURE — 99203 OFFICE O/P NEW LOW 30 MIN: CPT

## 2022-11-10 NOTE — ASSESSMENT
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  He enjoys exercising and lifting mild to moderate weights.\par \par Physical examination demonstrates a very large tender bulge in the left groin which extends deep into his left hemiscrotum which is not reducible consistent with an incarcerated left inguinal scrotal hernia warranting surgical repair.  Examination of his right groin demonstrates a nearly as large bulge which is easily reducible extending only into the upper right hemiscrotum consistent with a symptomatic protruding right inguinal hernia which also warrants surgical repair.  Both testicles appear normal.  His umbilical examination demonstrates a strawberry size tender bulge which appears to be reducible consistent with a periumbilical incisional hernia (related to his laparoscopic cholecystectomy trocar site incision) which deserves concomitant repair.  His current BMI is 31.\par \par I explained the pros and cons of surgery, as well as all risks, benefits, indications and alternatives of the procedure and the patient understood and agreed.  He is retiring at the end of this year and would prefer to defer surgical intervention until early January.  Ike was scheduled for the repair of his incarcerated left inguinal hernia with mesh, the repair of his right inguinal hernia with mesh and the repair of his periumbilical incisional hernia with mesh on Friday, January 6, 2023 under LOCAL with IV SEDATION at [facility] with presurgical testing waived.  He was encouraged to avoid heavy lifting and strenuous activity in the interim, of course.\par \par He may remain on his baby aspirin throughout the perioperative period.  He is scheduled for a routine stress echo in the near future and his cardiologist will send us a short preoperative evaluation/clearance note prior to his upcoming procedure.

## 2022-11-10 NOTE — PHYSICAL EXAM
[JVD] : no jugular venous distention  [Normal Breath Sounds] : Normal breath sounds [No Rash or Lesion] : No rash or lesion [Alert] : alert [Calm] : calm [de-identified] : Healthy [de-identified] : Normal [de-identified] : Mildly protuberant abdomen [de-identified] : Normal testicles [de-identified] : Large incarcerated left inguinal scrotal hernia; large reducible right inguinal hernia; tender reducible periumbilical incisional hernia

## 2022-11-18 ENCOUNTER — APPOINTMENT (OUTPATIENT)
Dept: CARDIOLOGY | Facility: CLINIC | Age: 67
End: 2022-11-18

## 2022-11-18 DIAGNOSIS — I25.9 CHRONIC ISCHEMIC HEART DISEASE, UNSPECIFIED: ICD-10-CM

## 2022-11-18 DIAGNOSIS — Z95.1 PRESENCE OF AORTOCORONARY BYPASS GRAFT: ICD-10-CM

## 2022-11-18 DIAGNOSIS — Z01.810 ENCOUNTER FOR PREPROCEDURAL CARDIOVASCULAR EXAMINATION: ICD-10-CM

## 2022-11-18 PROCEDURE — 93351 STRESS TTE COMPLETE: CPT

## 2022-11-18 PROCEDURE — 93320 DOPPLER ECHO COMPLETE: CPT

## 2022-11-18 PROCEDURE — 93325 DOPPLER ECHO COLOR FLOW MAPG: CPT

## 2022-11-30 ENCOUNTER — RX RENEWAL (OUTPATIENT)
Age: 67
End: 2022-11-30

## 2022-12-01 ENCOUNTER — APPOINTMENT (OUTPATIENT)
Dept: CARDIOLOGY | Facility: CLINIC | Age: 67
End: 2022-12-01

## 2022-12-01 ENCOUNTER — APPOINTMENT (OUTPATIENT)
Dept: PLASTIC SURGERY | Facility: CLINIC | Age: 67
End: 2022-12-01

## 2022-12-01 VITALS — BODY MASS INDEX: 30.36 KG/M2 | HEIGHT: 69 IN | WEIGHT: 205 LBS

## 2022-12-01 PROCEDURE — 93880 EXTRACRANIAL BILAT STUDY: CPT

## 2022-12-01 PROCEDURE — 99203 OFFICE O/P NEW LOW 30 MIN: CPT

## 2022-12-01 NOTE — PHYSICAL EXAM
[Normal Breath Sounds] : Normal breath sounds [No Rash or Lesion] : No rash or lesion [Alert] : alert [Calm] : calm [JVD] : no jugular venous distention  [de-identified] : Healthy [de-identified] : Normal [de-identified] : Mildly protuberant abdomen [de-identified] : Normal testicles [de-identified] : Large incarcerated left inguinal scrotal hernia; large reducible right inguinal hernia; tender reducible periumbilical incisional hernia

## 2022-12-01 NOTE — ASSESSMENT
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  He enjoys exercising and lifting mild to moderate weights.\par \par Physical examination demonstrates a very large tender bulge in the left groin which extends deep into his left hemiscrotum which is not reducible consistent with an incarcerated left inguinal scrotal hernia warranting surgical repair.  Examination of his right groin demonstrates a nearly as large bulge which is easily reducible extending only into the upper right hemiscrotum consistent with a symptomatic protruding right inguinal hernia which also warrants surgical repair.  Both testicles appear normal.  His umbilical examination demonstrates a strawberry size tender bulge which appears to be reducible consistent with a periumbilical incisional hernia (related to his laparoscopic cholecystectomy trocar site incision) which deserves concomitant repair.  His current BMI is 31.\par \par I explained the pros and cons of surgery, as well as all risks, benefits, indications and alternatives of the procedure and the patient understood and agreed.  He is retiring at the end of this year and would prefer to defer surgical intervention until early January.  Ike was scheduled for the repair of his incarcerated left inguinal hernia with mesh, the repair of his right inguinal hernia with mesh and the repair of his periumbilical incisional hernia with mesh on Friday, January 6, 2023 under LOCAL with IV SEDATION at the [facility] with presurgical testing waived.  He was encouraged to avoid heavy lifting and strenuous activity in the interim, of course.\par \par He may remain on his baby aspirin throughout the perioperative period.  He is scheduled for a routine stress echo in the near future and his cardiologist will send us a short preoperative evaluation/clearance note prior to his upcoming procedure.\par \par 12/1/2022.\par as above\par right suprclavicular lipoma approx 9 x 12cm x 8?cm\par unknown if arising from deeper muscle\par no spinal accessory weaknes on that side\par \par offered imaging w MRI and if not involving muscle could be reasonable to do in office based on findings\par however\par plan for excision in ZAID without MRI\par \par all ?s answered\par discussed risk recurrence, seroma, bleeding, infection, nerve in jury w temp or perm deficitys\par \par Due to COVID 19, pre-visit patient instructions were explained to the patient and their symptoms were checked upon arrival.  \par Masks were used by the health care providers and staff and the examination room was cleaned after the patient visit was completed.\par 
rolling walker

## 2022-12-23 ENCOUNTER — LABORATORY RESULT (OUTPATIENT)
Age: 67
End: 2022-12-23

## 2023-01-03 ENCOUNTER — NON-APPOINTMENT (OUTPATIENT)
Age: 68
End: 2023-01-03

## 2023-01-04 ENCOUNTER — LABORATORY RESULT (OUTPATIENT)
Age: 68
End: 2023-01-04

## 2023-01-05 NOTE — ASU PATIENT PROFILE, ADULT - NSICDXPASTSURGICALHX_GEN_ALL_CORE_FT
PAST SURGICAL HISTORY:  S/P CABG (coronary artery bypass graft)      PAST SURGICAL HISTORY:  History of cholecystitis     S/P CABG (coronary artery bypass graft)

## 2023-01-05 NOTE — ASU PATIENT PROFILE, ADULT - NSICDXPASTMEDICALHX_GEN_ALL_CORE_FT
PAST MEDICAL HISTORY:  CAD (coronary artery disease)      PAST MEDICAL HISTORY:  CAD (coronary artery disease)     Diverticulitis     High cholesterol     Mild HTN

## 2023-01-05 NOTE — ASU PATIENT PROFILE, ADULT - HISTORY OF COVID-19 VACCINATION
68 yo man with hx of stroke and epilepsy compliant with keppra with 2 convulsive seizures this morning in the setting of hypoglycemia and COVID    Epilepsy  Seizures likely in the setting of COVID and hypoglycemia  continuous video EEG  C/w Keppra 750mg bid    Hypoglycemia  Management as per primary team    Will continue to follow   Yes

## 2023-01-06 ENCOUNTER — OUTPATIENT (OUTPATIENT)
Dept: OUTPATIENT SERVICES | Facility: HOSPITAL | Age: 68
LOS: 1 days | Discharge: HOME | End: 2023-01-06

## 2023-01-06 ENCOUNTER — APPOINTMENT (OUTPATIENT)
Dept: SURGERY | Facility: AMBULATORY SURGERY CENTER | Age: 68
End: 2023-01-06
Payer: COMMERCIAL

## 2023-01-06 ENCOUNTER — TRANSCRIPTION ENCOUNTER (OUTPATIENT)
Age: 68
End: 2023-01-06

## 2023-01-06 VITALS
SYSTOLIC BLOOD PRESSURE: 126 MMHG | DIASTOLIC BLOOD PRESSURE: 76 MMHG | OXYGEN SATURATION: 99 % | TEMPERATURE: 98 F | HEART RATE: 59 BPM | RESPIRATION RATE: 18 BRPM

## 2023-01-06 VITALS
DIASTOLIC BLOOD PRESSURE: 78 MMHG | TEMPERATURE: 98 F | RESPIRATION RATE: 20 BRPM | HEIGHT: 68 IN | WEIGHT: 203.05 LBS | HEART RATE: 61 BPM | SYSTOLIC BLOOD PRESSURE: 147 MMHG

## 2023-01-06 DIAGNOSIS — Z95.1 PRESENCE OF AORTOCORONARY BYPASS GRAFT: Chronic | ICD-10-CM

## 2023-01-06 DIAGNOSIS — Z87.19 PERSONAL HISTORY OF OTHER DISEASES OF THE DIGESTIVE SYSTEM: Chronic | ICD-10-CM

## 2023-01-06 PROCEDURE — 49505 PRP I/HERN INIT REDUC >5 YR: CPT | Mod: RT,XS

## 2023-01-06 PROCEDURE — 49507 PRP I/HERN INIT BLOCK >5 YR: CPT | Mod: LT

## 2023-01-06 PROCEDURE — 49594 RPR AA HRN 1ST 3-10 NCR/STRN: CPT | Mod: XS

## 2023-01-06 RX ORDER — TRAMADOL HYDROCHLORIDE 50 MG/1
1 TABLET ORAL
Qty: 24 | Refills: 0
Start: 2023-01-06 | End: 2023-01-09

## 2023-01-06 RX ORDER — ONDANSETRON 8 MG/1
4 TABLET, FILM COATED ORAL ONCE
Refills: 0 | Status: DISCONTINUED | OUTPATIENT
Start: 2023-01-06 | End: 2023-01-20

## 2023-01-06 RX ORDER — MEPERIDINE HYDROCHLORIDE 50 MG/ML
12.5 INJECTION INTRAMUSCULAR; INTRAVENOUS; SUBCUTANEOUS ONCE
Refills: 0 | Status: DISCONTINUED | OUTPATIENT
Start: 2023-01-06 | End: 2023-01-06

## 2023-01-06 RX ORDER — SODIUM CHLORIDE 9 MG/ML
1000 INJECTION, SOLUTION INTRAVENOUS
Refills: 0 | Status: DISCONTINUED | OUTPATIENT
Start: 2023-01-06 | End: 2023-01-20

## 2023-01-06 RX ORDER — OXYCODONE AND ACETAMINOPHEN 5; 325 MG/1; MG/1
2 TABLET ORAL ONCE
Refills: 0 | Status: DISCONTINUED | OUTPATIENT
Start: 2023-01-06 | End: 2023-01-06

## 2023-01-06 RX ORDER — HYDROMORPHONE HYDROCHLORIDE 2 MG/ML
0.5 INJECTION INTRAMUSCULAR; INTRAVENOUS; SUBCUTANEOUS
Refills: 0 | Status: DISCONTINUED | OUTPATIENT
Start: 2023-01-06 | End: 2023-01-06

## 2023-01-06 NOTE — BRIEF OPERATIVE NOTE - NSICDXBRIEFPROCEDURE_GEN_ALL_CORE_FT
PROCEDURES:  Open repair of incarcerated inguinal hernia using mesh in adult 06-Jan-2023 10:00:21 LEFT Harsha Churchill  Repair of right inguinal hernia with mesh 06-Jan-2023 10:00:23  Harsha Churchill  Repair of anterior abdominal hernia(s) (ie, epigastric, incisional, ventral, umbilical, Spigelian), 06-Jan-2023 12:33:11  Harsha Churchill

## 2023-01-06 NOTE — BRIEF OPERATIVE NOTE - NSICDXBRIEFPOSTOP_GEN_ALL_CORE_FT
POST-OP DIAGNOSIS:  Incarcerated left inguinal hernia 06-Jan-2023 10:00:11  Harsha Churchill  Inguinal hernia, right 06-Jan-2023 10:00:12  Harsha Churchill  Incarcerated incisional hernia 06-Jan-2023 12:33:34  Harsha Churchill

## 2023-01-06 NOTE — ASU DISCHARGE PLAN (ADULT/PEDIATRIC) - CARE PROVIDER_API CALL
Harsha Churchill)  Surgery  [address]  [place] [address]  Phone: [phone]  Fax: [phone]  Scheduled Appointment: 01/17/2023

## 2023-01-06 NOTE — CHART NOTE - NSCHARTNOTEFT_GEN_A_CORE
PACU ANESTHESIA ADMISSION NOTE      Procedure: Open repair of incarcerated inguinal hernia using mesh in adult  LEFT    Repair of right inguinal hernia with mesh    Repair of anterior abdominal hernia(s) (ie, epigastric, incisional, ventral, umbilical, Spigelian),      Post op diagnosis:  Incarcerated left inguinal hernia    Inguinal hernia, right    Incarcerated incisional hernia        ____  Intubated  TV:______       Rate: ______      FiO2: ______    ___x_  Patent Airway    ___x_  Full return of protective reflexes    __x__  Full recovery from anesthesia / back to baseline status    Vitals:  temp(F) 98  /77  spo2 98  RR 16  pulse 55    Mental Status:  _x ___ Awake   _____ Alert   _____ Drowsy   _____ Sedated    Nausea/Vomiting:  ____x  NO  ______Yes,   See Post - Op Orders          Pain Scale (0-10):  _____    Treatment: ____ None    __x __ See Post - Op/PCA Orders    Post - Operative Fluids:   ____ Oral   _x ___ See Post - Op Orders    Plan: Discharge:   _x ___Home       _____Floor     _____Critical Care    _____  Other:_________________    Comments: uneventful anesthesia course no complications. Vitals stable. Pt transferred to PACU

## 2023-01-06 NOTE — ASU DISCHARGE PLAN (ADULT/PEDIATRIC) - NS MD DC FALL RISK RISK
For information on Fall & Injury Prevention, visit: [email or web address] OR  [email or web address] OR  https://www.cdc.gov/steadi/patient.html

## 2023-01-06 NOTE — BRIEF OPERATIVE NOTE - NSICDXBRIEFPREOP_GEN_ALL_CORE_FT
PRE-OP DIAGNOSIS:  Incarcerated left inguinal hernia 06-Jan-2023 09:59:48  Harsha Churchill  Inguinal hernia, right 06-Jan-2023 09:59:53  Harsha Churchill  Incisional hernia 06-Jan-2023 10:00:06  Harsha Churchill

## 2023-01-10 PROBLEM — K57.92 DIVERTICULITIS OF INTESTINE, PART UNSPECIFIED, WITHOUT PERFORATION OR ABSCESS WITHOUT BLEEDING: Chronic | Status: ACTIVE | Noted: 2023-01-06

## 2023-01-10 PROBLEM — I10 ESSENTIAL (PRIMARY) HYPERTENSION: Chronic | Status: ACTIVE | Noted: 2023-01-06

## 2023-01-10 PROBLEM — E78.00 PURE HYPERCHOLESTEROLEMIA, UNSPECIFIED: Chronic | Status: ACTIVE | Noted: 2023-01-06

## 2023-01-12 DIAGNOSIS — K40.90 UNILATERAL INGUINAL HERNIA, WITHOUT OBSTRUCTION OR GANGRENE, NOT SPECIFIED AS RECURRENT: ICD-10-CM

## 2023-01-12 DIAGNOSIS — K40.30 UNILATERAL INGUINAL HERNIA, WITH OBSTRUCTION, WITHOUT GANGRENE, NOT SPECIFIED AS RECURRENT: ICD-10-CM

## 2023-01-12 DIAGNOSIS — Z79.82 LONG TERM (CURRENT) USE OF ASPIRIN: ICD-10-CM

## 2023-01-12 DIAGNOSIS — Z95.1 PRESENCE OF AORTOCORONARY BYPASS GRAFT: ICD-10-CM

## 2023-01-12 DIAGNOSIS — Z79.84 LONG TERM (CURRENT) USE OF ORAL HYPOGLYCEMIC DRUGS: ICD-10-CM

## 2023-01-12 DIAGNOSIS — K43.0 INCISIONAL HERNIA WITH OBSTRUCTION, WITHOUT GANGRENE: ICD-10-CM

## 2023-01-12 DIAGNOSIS — I25.10 ATHEROSCLEROTIC HEART DISEASE OF NATIVE CORONARY ARTERY WITHOUT ANGINA PECTORIS: ICD-10-CM

## 2023-01-17 ENCOUNTER — APPOINTMENT (OUTPATIENT)
Dept: SURGERY | Facility: CLINIC | Age: 68
End: 2023-01-17
Payer: COMMERCIAL

## 2023-01-17 DIAGNOSIS — K43.2 INCISIONAL HERNIA W/OUT OBSTRUCTION OR GANGRENE: ICD-10-CM

## 2023-01-17 DIAGNOSIS — K40.30 UNILATERAL INGUINAL HERNIA, WITH OBSTRUCTION, W/OUT GANGRENE, NOT SPECIFIED AS RECURRENT: ICD-10-CM

## 2023-01-17 PROCEDURE — 99024 POSTOP FOLLOW-UP VISIT: CPT

## 2023-01-17 NOTE — ASSESSMENT
[FreeTextEntry1] : Ike underwent the repair of his incarcerated left inguinal scrotal hernia with mesh and the repair of his right inguinal hernia repair with mesh and the repair of his incarcerated periumbilical incisional hernia with mesh on January 6, 2023 under local with IV sedation without any problems or complications. His wounds are clean, dry and intact. There is no evidence of erythema, seroma formation or infection. He is tolerating a diet and having normal bowel movements. He denies any significant postoperative pain or discomfort at this time.\par \par He was counseled and reassured.  Ike was discharged from the office with no specific followup necessary, but he knows to avoid any heavy lifting or strenuous activity for the next several weeks.

## 2023-02-21 ENCOUNTER — OUTPATIENT (OUTPATIENT)
Dept: OUTPATIENT SERVICES | Facility: HOSPITAL | Age: 68
LOS: 1 days | End: 2023-02-21
Payer: MEDICARE

## 2023-02-21 ENCOUNTER — RESULT REVIEW (OUTPATIENT)
Age: 68
End: 2023-02-21

## 2023-02-21 VITALS
TEMPERATURE: 98 F | OXYGEN SATURATION: 99 % | HEIGHT: 69 IN | RESPIRATION RATE: 15 BRPM | SYSTOLIC BLOOD PRESSURE: 156 MMHG | DIASTOLIC BLOOD PRESSURE: 78 MMHG | HEART RATE: 78 BPM | WEIGHT: 201.5 LBS

## 2023-02-21 DIAGNOSIS — Z98.890 OTHER SPECIFIED POSTPROCEDURAL STATES: Chronic | ICD-10-CM

## 2023-02-21 DIAGNOSIS — Z95.1 PRESENCE OF AORTOCORONARY BYPASS GRAFT: Chronic | ICD-10-CM

## 2023-02-21 DIAGNOSIS — Z01.818 ENCOUNTER FOR OTHER PREPROCEDURAL EXAMINATION: ICD-10-CM

## 2023-02-21 DIAGNOSIS — Z87.19 PERSONAL HISTORY OF OTHER DISEASES OF THE DIGESTIVE SYSTEM: Chronic | ICD-10-CM

## 2023-02-21 DIAGNOSIS — D17.0 BENIGN LIPOMATOUS NEOPLASM OF SKIN AND SUBCUTANEOUS TISSUE OF HEAD, FACE AND NECK: ICD-10-CM

## 2023-02-21 LAB
ALBUMIN SERPL ELPH-MCNC: 4.9 G/DL — SIGNIFICANT CHANGE UP (ref 3.5–5.2)
ALP SERPL-CCNC: 58 U/L — SIGNIFICANT CHANGE UP (ref 30–115)
ALT FLD-CCNC: 25 U/L — SIGNIFICANT CHANGE UP (ref 0–41)
ANION GAP SERPL CALC-SCNC: 14 MMOL/L — SIGNIFICANT CHANGE UP (ref 7–14)
APTT BLD: 31.8 SEC — SIGNIFICANT CHANGE UP (ref 27–39.2)
AST SERPL-CCNC: 24 U/L — SIGNIFICANT CHANGE UP (ref 0–41)
BASOPHILS # BLD AUTO: 0 K/UL — SIGNIFICANT CHANGE UP (ref 0–0.2)
BASOPHILS NFR BLD AUTO: 0 % — SIGNIFICANT CHANGE UP (ref 0–1)
BILIRUB SERPL-MCNC: 0.6 MG/DL — SIGNIFICANT CHANGE UP (ref 0.2–1.2)
BUN SERPL-MCNC: 16 MG/DL — SIGNIFICANT CHANGE UP (ref 10–20)
CALCIUM SERPL-MCNC: 9.9 MG/DL — SIGNIFICANT CHANGE UP (ref 8.4–10.5)
CHLORIDE SERPL-SCNC: 103 MMOL/L — SIGNIFICANT CHANGE UP (ref 98–110)
CO2 SERPL-SCNC: 24 MMOL/L — SIGNIFICANT CHANGE UP (ref 17–32)
CREAT SERPL-MCNC: 1.3 MG/DL — SIGNIFICANT CHANGE UP (ref 0.7–1.5)
EGFR: 61 ML/MIN/1.73M2 — SIGNIFICANT CHANGE UP
EOSINOPHIL # BLD AUTO: 0.16 K/UL — SIGNIFICANT CHANGE UP (ref 0–0.7)
EOSINOPHIL NFR BLD AUTO: 2 % — SIGNIFICANT CHANGE UP (ref 0–8)
GLUCOSE SERPL-MCNC: 97 MG/DL — SIGNIFICANT CHANGE UP (ref 70–99)
HCT VFR BLD CALC: 43.3 % — SIGNIFICANT CHANGE UP (ref 42–52)
HGB BLD-MCNC: 15.3 G/DL — SIGNIFICANT CHANGE UP (ref 14–18)
INR BLD: 1.02 RATIO — SIGNIFICANT CHANGE UP (ref 0.65–1.3)
LYMPHOCYTES # BLD AUTO: 2.56 K/UL — SIGNIFICANT CHANGE UP (ref 1–3.3)
LYMPHOCYTES # BLD AUTO: 33 % — SIGNIFICANT CHANGE UP (ref 13–44)
MANUAL SMEAR VERIFICATION: SIGNIFICANT CHANGE UP
MCHC RBC-ENTMCNC: 32.5 PG — HIGH (ref 27–31)
MCHC RBC-ENTMCNC: 35.3 G/DL — SIGNIFICANT CHANGE UP (ref 32–37)
MCV RBC AUTO: 91.9 FL — SIGNIFICANT CHANGE UP (ref 80–94)
MONOCYTES # BLD AUTO: 0.54 K/UL — SIGNIFICANT CHANGE UP (ref 0.1–0.6)
MONOCYTES NFR BLD AUTO: 7 % — SIGNIFICANT CHANGE UP (ref 1.7–9.3)
MYELOCYTES NFR BLD: 1 % — HIGH (ref 0–0)
NEUTROPHILS # BLD AUTO: 4.19 K/UL — SIGNIFICANT CHANGE UP (ref 1.4–6.5)
NEUTROPHILS NFR BLD AUTO: 54 % — SIGNIFICANT CHANGE UP (ref 42.2–75.2)
NRBC # BLD: 0 /100 — SIGNIFICANT CHANGE UP (ref 0–0)
NRBC # BLD: SIGNIFICANT CHANGE UP /100 WBCS (ref 0–0)
PLAT MORPH BLD: NORMAL — SIGNIFICANT CHANGE UP
PLATELET # BLD AUTO: 196 K/UL — SIGNIFICANT CHANGE UP (ref 130–400)
POTASSIUM SERPL-MCNC: 4.3 MMOL/L — SIGNIFICANT CHANGE UP (ref 3.5–5)
POTASSIUM SERPL-SCNC: 4.3 MMOL/L — SIGNIFICANT CHANGE UP (ref 3.5–5)
PROT SERPL-MCNC: 7.3 G/DL — SIGNIFICANT CHANGE UP (ref 6–8)
PROTHROM AB SERPL-ACNC: 11.6 SEC — SIGNIFICANT CHANGE UP (ref 9.95–12.87)
RBC # BLD: 4.71 M/UL — SIGNIFICANT CHANGE UP (ref 4.7–6.1)
RBC # FLD: 13.3 % — SIGNIFICANT CHANGE UP (ref 11.5–14.5)
RBC BLD AUTO: NORMAL — SIGNIFICANT CHANGE UP
SODIUM SERPL-SCNC: 141 MMOL/L — SIGNIFICANT CHANGE UP (ref 135–146)
VARIANT LYMPHS # BLD: 3 % — SIGNIFICANT CHANGE UP (ref 0–6)
WBC # BLD: 7.76 K/UL — SIGNIFICANT CHANGE UP (ref 4.8–10.8)
WBC # FLD AUTO: 7.76 K/UL — SIGNIFICANT CHANGE UP (ref 4.8–10.8)

## 2023-02-21 PROCEDURE — 99214 OFFICE O/P EST MOD 30 MIN: CPT | Mod: 25

## 2023-02-21 PROCEDURE — 36415 COLL VENOUS BLD VENIPUNCTURE: CPT

## 2023-02-21 PROCEDURE — 93005 ELECTROCARDIOGRAM TRACING: CPT

## 2023-02-21 PROCEDURE — 85730 THROMBOPLASTIN TIME PARTIAL: CPT

## 2023-02-21 PROCEDURE — 85610 PROTHROMBIN TIME: CPT

## 2023-02-21 PROCEDURE — 93010 ELECTROCARDIOGRAM REPORT: CPT

## 2023-02-21 PROCEDURE — 71046 X-RAY EXAM CHEST 2 VIEWS: CPT

## 2023-02-21 PROCEDURE — 80053 COMPREHEN METABOLIC PANEL: CPT

## 2023-02-21 PROCEDURE — 71046 X-RAY EXAM CHEST 2 VIEWS: CPT | Mod: 26

## 2023-02-21 PROCEDURE — 85025 COMPLETE CBC W/AUTO DIFF WBC: CPT

## 2023-02-21 NOTE — H&P PST ADULT - NSICDXPASTMEDICALHX_GEN_ALL_CORE_FT
PAST MEDICAL HISTORY:  CAD (coronary artery disease)     Diverticulitis     High cholesterol     Mild HTN

## 2023-02-21 NOTE — H&P PST ADULT - HISTORY OF PRESENT ILLNESS
PT PRESENTS TO PAST WITH NO SOB, CP, PALPITATIONS, DYSURIA, UTI OR URI AT PRESENT.   PT ABLE TO WALK UP 2-3 FLIGHTS OF STEPS WITH NO SOB.  AS PER THE PT, THIS IS HIS/HER COMPLETE MEDICAL AND SURGICAL HX, INCLUDING MEDICATIONS PRESCRIBED AND OVER THE COUNTER  pt denies any covid s/s, or tested positive in the past--PT IS AWARE OF DATE AND TIME OF COVID TESTING PRIOR TO DOS.   pt advised self quarantine till day of procedure  denies travel outside the [place] in the past 30 days  Anesthesia Alert  NO--Difficult Airway  NO--History of neck surgery or radiation  NO--Limited ROM of neck  NO--History of Malignant hyperthermia  NO--Personal or family history of Pseudocholinesterase deficiency  NO--Prior Anesthesia Complication  NO--Latex Allergy  NO--Loose teeth  NO--History of Rheumatoid Arthritis  NO--TYLER  NO BLEEDING RISK  NO--Other_____

## 2023-02-21 NOTE — H&P PST ADULT - NSICDXPASTSURGICALHX_GEN_ALL_CORE_FT
PAST SURGICAL HISTORY:  H/O hernia repair     History of cholecystitis     S/P CABG (coronary artery bypass graft)

## 2023-02-21 NOTE — H&P PST ADULT - REASON FOR ADMISSION
Proceduralist: Antolin Hopson  Procedure: EXCISION OF RIGHT SUPRACLAVICULAR LIPOMA  Procedure: 90 Minutes  Anesthesia Type: Local Standby  PT STATES--I HAVE HAD A LIPOMA FOR 3.5 YRS.  RECENTLY IT HAS GOTTEN LARGER.  PT DENIES HAVING ANY PAIN.

## 2023-02-22 DIAGNOSIS — Z01.818 ENCOUNTER FOR OTHER PREPROCEDURAL EXAMINATION: ICD-10-CM

## 2023-02-22 DIAGNOSIS — D17.0 BENIGN LIPOMATOUS NEOPLASM OF SKIN AND SUBCUTANEOUS TISSUE OF HEAD, FACE AND NECK: ICD-10-CM

## 2023-03-06 RX ORDER — CELECOXIB 200 MG/1
200 CAPSULE ORAL
Qty: 7 | Refills: 0 | Status: ACTIVE | COMMUNITY
Start: 2023-03-06 | End: 1900-01-01

## 2023-03-06 RX ORDER — GABAPENTIN 300 MG/1
300 CAPSULE ORAL
Qty: 7 | Refills: 0 | Status: ACTIVE | COMMUNITY
Start: 2023-03-06 | End: 1900-01-01

## 2023-03-10 NOTE — ASU PATIENT PROFILE, ADULT - ANESTHESIA, PREVIOUS REACTION, PROFILE
Attempted to reach patient regarding normal scrotal ultrasound.  No answer, left VM with call back phone number.  
none

## 2023-03-10 NOTE — ASU PATIENT PROFILE, ADULT - FALL HARM RISK - UNIVERSAL INTERVENTIONS
Bed in lowest position, wheels locked, appropriate side rails in place/Call bell, personal items and telephone in reach/Instruct patient to call for assistance before getting out of bed or chair/Non-slip footwear when patient is out of bed/[place] to call system/Physically safe environment - no spills, clutter or unnecessary equipment/Purposeful Proactive Rounding/Room/bathroom lighting operational, light cord in reach

## 2023-03-13 ENCOUNTER — RESULT REVIEW (OUTPATIENT)
Age: 68
End: 2023-03-13

## 2023-03-13 ENCOUNTER — OUTPATIENT (OUTPATIENT)
Dept: INPATIENT UNIT | Facility: HOSPITAL | Age: 68
LOS: 1 days | Discharge: HOME | End: 2023-03-13
Payer: MEDICARE

## 2023-03-13 ENCOUNTER — APPOINTMENT (OUTPATIENT)
Dept: PLASTIC SURGERY | Facility: AMBULATORY SURGERY CENTER | Age: 68
End: 2023-03-13
Payer: MEDICARE

## 2023-03-13 ENCOUNTER — TRANSCRIPTION ENCOUNTER (OUTPATIENT)
Age: 68
End: 2023-03-13

## 2023-03-13 VITALS
TEMPERATURE: 98 F | HEIGHT: 69 IN | OXYGEN SATURATION: 100 % | WEIGHT: 201.5 LBS | DIASTOLIC BLOOD PRESSURE: 71 MMHG | SYSTOLIC BLOOD PRESSURE: 144 MMHG | HEART RATE: 59 BPM | RESPIRATION RATE: 20 BRPM

## 2023-03-13 VITALS
RESPIRATION RATE: 20 BRPM | OXYGEN SATURATION: 100 % | DIASTOLIC BLOOD PRESSURE: 64 MMHG | HEART RATE: 87 BPM | SYSTOLIC BLOOD PRESSURE: 115 MMHG

## 2023-03-13 DIAGNOSIS — D17.0 BENIGN LIPOMATOUS NEOPLASM OF SKIN AND SUBCUTANEOUS TISSUE OF HEAD, FACE AND NECK: ICD-10-CM

## 2023-03-13 DIAGNOSIS — Z95.1 PRESENCE OF AORTOCORONARY BYPASS GRAFT: Chronic | ICD-10-CM

## 2023-03-13 DIAGNOSIS — Z98.890 OTHER SPECIFIED POSTPROCEDURAL STATES: Chronic | ICD-10-CM

## 2023-03-13 DIAGNOSIS — Z87.19 PERSONAL HISTORY OF OTHER DISEASES OF THE DIGESTIVE SYSTEM: Chronic | ICD-10-CM

## 2023-03-13 PROCEDURE — 88304 TISSUE EXAM BY PATHOLOGIST: CPT

## 2023-03-13 PROCEDURE — 21552 EXC NECK LES SC 3 CM/>: CPT | Mod: 59

## 2023-03-13 PROCEDURE — 14040 TIS TRNFR F/C/C/M/N/A/G/H/F: CPT

## 2023-03-13 PROCEDURE — 88304 TISSUE EXAM BY PATHOLOGIST: CPT | Mod: 26

## 2023-03-13 RX ORDER — OLMESARTAN MEDOXOMIL 5 MG/1
80 TABLET, FILM COATED ORAL
Qty: 0 | Refills: 0 | DISCHARGE

## 2023-03-13 RX ORDER — CELECOXIB 200 MG/1
1 CAPSULE ORAL
Qty: 0 | Refills: 0 | DISCHARGE

## 2023-03-13 RX ORDER — GABAPENTIN 400 MG/1
1 CAPSULE ORAL
Qty: 0 | Refills: 0 | DISCHARGE

## 2023-03-13 RX ORDER — HYDROMORPHONE HYDROCHLORIDE 2 MG/ML
0.5 INJECTION INTRAMUSCULAR; INTRAVENOUS; SUBCUTANEOUS
Refills: 0 | Status: DISCONTINUED | OUTPATIENT
Start: 2023-03-13 | End: 2023-03-13

## 2023-03-13 RX ORDER — OXYCODONE HYDROCHLORIDE 5 MG/1
5 TABLET ORAL ONCE
Refills: 0 | Status: DISCONTINUED | OUTPATIENT
Start: 2023-03-13 | End: 2023-03-13

## 2023-03-13 RX ORDER — SODIUM CHLORIDE 9 MG/ML
1000 INJECTION, SOLUTION INTRAVENOUS
Refills: 0 | Status: DISCONTINUED | OUTPATIENT
Start: 2023-03-13 | End: 2023-03-13

## 2023-03-13 RX ORDER — GABAPENTIN 400 MG/1
0 CAPSULE ORAL
Qty: 0 | Refills: 0 | DISCHARGE

## 2023-03-13 RX ORDER — ONDANSETRON 8 MG/1
4 TABLET, FILM COATED ORAL ONCE
Refills: 0 | Status: DISCONTINUED | OUTPATIENT
Start: 2023-03-13 | End: 2023-03-13

## 2023-03-13 RX ORDER — ROSUVASTATIN CALCIUM 5 MG/1
1 TABLET ORAL
Qty: 0 | Refills: 0 | DISCHARGE

## 2023-03-13 RX ADMIN — SODIUM CHLORIDE 100 MILLILITER(S): 9 INJECTION, SOLUTION INTRAVENOUS at 08:39

## 2023-03-13 NOTE — ASU DISCHARGE PLAN (ADULT/PEDIATRIC) - ASU DC SPECIAL INSTRUCTIONSFT
Keep surgical site clean and dry.   Do not remove any dressings or get them wet.   Sleep on your back with head elevated to reduce swelling.   Do not be alarmed by bruising, swelling and minor bleeding, that's expected.   May apply ice pack on-and-off to the area to reduce swelling.   Rest, no lifting.

## 2023-03-13 NOTE — ASU PREOP CHECKLIST - NSSDAENDDT_GEN_ALL_CORE
ems states, " pt with nausea , vomiting and abdominal pain that started today , hx gastritis " 13-Mar-2023 07:22

## 2023-03-13 NOTE — BRIEF OPERATIVE NOTE - NSICDXBRIEFPREOP_GEN_ALL_CORE_FT
PRE-OP DIAGNOSIS:  Supraclavicular mass 13-Mar-2023 08:22:59 right supraclavicular lipoma Sandhya Garcia

## 2023-03-13 NOTE — ASU DISCHARGE PLAN (ADULT/PEDIATRIC) - CARE PROVIDER_API CALL
Antolin Hopson)  Plastic Surgery; Surgery of the Hand  [address] [place] [address], [address]  [place] [address]  Phone: [phone]  Fax: [phone]  Follow Up Time:

## 2023-03-13 NOTE — BRIEF OPERATIVE NOTE - NSICDXBRIEFPROCEDURE_GEN_ALL_CORE_FT
PROCEDURES:  Excision of subcutaneous tumor of shoulder area; 3 cm or greater 13-Mar-2023 08:27:50 excision of right supraclavicular lipoma Sandhya Garcia

## 2023-03-13 NOTE — CHART NOTE - NSCHARTNOTEFT_GEN_A_CORE
PACU ANESTHESIA ADMISSION NOTE      Procedure: Right supracalvicular lipoma removal  Post op diagnosis:  Supraclavicular mass    __x__  Patent Airway    ___x_  Full return of protective reflexes    __x__  Full recovery from anesthesia / back to baseline     Vitals:   T:  36.6C         R: 12                 BP:   112/58               Sat:    99%               P: 80      Mental Status:  ____ Awake   _____ Alert   ___x__ Drowsy   _____ Sedated    Nausea/Vomiting:  ____ NO  ______Yes,   See Post - Op Orders          Pain Scale (0-10):  _____    Treatment: ____ None    ___x_ See Post - Op/PCA Orders    Post - Operative Fluids:   ____ Oral   __x__ See Post - Op Orders    Plan: Discharge:   __x__Home       _____Floor     _____Critical Care    _____  Other:_________________    Comments: Pt tolerated procedure well, no anesthesia related complications. Care of pt endorsed to PACU, report given to PACU RN. Discharge when criteria are met.

## 2023-03-13 NOTE — PRE-ANESTHESIA EVALUATION ADULT - NSANTHOSAYNRD_GEN_A_CORE
prior test years ago with very mild TYLER not needing CPAP/No. TYLER screening performed.  STOP BANG Legend: 0-2 = LOW Risk; 3-4 = INTERMEDIATE Risk; 5-8 = HIGH Risk

## 2023-03-13 NOTE — BRIEF OPERATIVE NOTE - NSICDXBRIEFPOSTOP_GEN_ALL_CORE_FT
POST-OP DIAGNOSIS:  Supraclavicular mass 13-Mar-2023 08:23:48 right supraclavicular lipoma Sandhya Garcia

## 2023-03-14 LAB — SURGICAL PATHOLOGY STUDY: SIGNIFICANT CHANGE UP

## 2023-03-15 DIAGNOSIS — I25.10 ATHEROSCLEROTIC HEART DISEASE OF NATIVE CORONARY ARTERY WITHOUT ANGINA PECTORIS: ICD-10-CM

## 2023-03-15 DIAGNOSIS — E78.00 PURE HYPERCHOLESTEROLEMIA, UNSPECIFIED: ICD-10-CM

## 2023-03-15 DIAGNOSIS — Z95.1 PRESENCE OF AORTOCORONARY BYPASS GRAFT: ICD-10-CM

## 2023-03-15 DIAGNOSIS — D17.0 BENIGN LIPOMATOUS NEOPLASM OF SKIN AND SUBCUTANEOUS TISSUE OF HEAD, FACE AND NECK: ICD-10-CM

## 2023-03-15 DIAGNOSIS — I10 ESSENTIAL (PRIMARY) HYPERTENSION: ICD-10-CM

## 2023-03-27 ENCOUNTER — APPOINTMENT (OUTPATIENT)
Dept: PLASTIC SURGERY | Facility: CLINIC | Age: 68
End: 2023-03-27
Payer: MEDICARE

## 2023-03-27 DIAGNOSIS — D17.0 BENIGN LIPOMATOUS NEOPLASM OF SKIN AND SUBCUTANEOUS TISSUE OF HEAD, FACE AND NECK: ICD-10-CM

## 2023-03-27 PROCEDURE — 99024 POSTOP FOLLOW-UP VISIT: CPT

## 2023-03-27 NOTE — HISTORY OF PRESENT ILLNESS
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 Pt being seen by  for inguinal hernia as well. \par \par \par Pt is now 2 weeks s/p excision of R supraclavicular lipoma on 3/13/2023. Denies fever/chills/drainage from area. Doing very well with no issues to note

## 2023-03-27 NOTE — ASSESSMENT
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 Pt being seen by  for inguinal hernia as well. \par \par \par 12/1/2022.\par as above\par right suprclavicular lipoma approx 9 x 12cm x 8?cm\par unknown if arising from deeper muscle\par no spinal accessory weaknes on that side\par \par \par Pt is now 2 weeks s/p excision of R supraclavicular lipoma on 3/13/2023. Doing well \par \par - All bandages changed, steri strips placed\par - Scar management: Aquaphor once steris fall off, then ok to switch to silicone based scar cream (scar guard/scar away). \par - Daily SPF\par - path reviewed with p \par - signs and symptoms of infection reviewed\par - All post op instructions reviewed, all questions answered\par - f/u pRn \par

## 2023-03-27 NOTE — PHYSICAL EXAM
[de-identified] : Well developed, well nourished in NAD  [de-identified] : Atraumatic, normocephalic  [de-identified] : Supple [de-identified] : R supraclavicular incision is CDI, skin edges well approximated with no open areas. No seroma/hematoma appreciated. No cellulitis or signs of infection

## 2023-03-27 NOTE — DATA REVIEWED
[FreeTextEntry1] : \par  Pathology             Final\par \par No Documents Attached\par \par \par \par \par   [facility] Accession Number : [id number]\par Patient:   RONALD MAHER\par \par \par Accession:                             [id number]\par \par Collected Date/Time:                   3/13/2023 08:15 EDT\par Received Date/Time:                    3/13/2023 11:13 EDT\par \par Surgical Pathology Report - Auth (Verified)\par \par Specimen(s) Submitted\par Right supraclavicular lipoma\par \par Final Diagnosis\par Right supraclavicular lipoma:\par - Mature adipose tissue, consistent with lipoma (benign).\par \par Verified by: Ollie Delgado M.D.\par (Electronic Signature)\par Reported on: 03/14/23 15:36 EDT, [facility],\par [address] [place] [address]\par Phone: [phone]   Fax: [phone]\par _________________________________________________________________\par \par Clinical Information\par Right supraclavicular lipoma\par \par Excision\par Several year history of right supraclavicular lipoma\par COVID Negative\par \par Perioperative Diagnosis\par Same\par \par Postoperative Diagnosis\par Same\par \par Gross Description\par The specimen is received in formalin labeled "right supraclavicular\par lipoma" and consists of a fragment of yellow lobulated adipose tissue,\par \par weighing 20 g and measuring 4.5 x 3 x 2.5 cm . The surface of the\par specimen is inked black. Cut surfaces show homogeneous yellow adipose\par tissue. Representative sections are submitted. (3 blocks)\par \par Specimen was received and underwent gross examination at [place] [id number].\par \par 03/13/2023 14:19:04 EDT  rc\par \par \par  Ordered by: YOKASTA WEN IV       Collected/Examined: 13Mar2023 08:15AM       \par Verification Required       Stage: Final       \par  Performed at: [facility]       Resulted: 14Mar2023 03:36PM       Last Updated: 14Mar2023 03:36PM       Accession: [id number]

## 2023-04-10 ENCOUNTER — TRANSCRIPTION ENCOUNTER (OUTPATIENT)
Age: 68
End: 2023-04-10

## 2023-05-16 NOTE — ASU PREOP CHECKLIST - ALLERGIES REVIEWED
ACM completed follow up call with patient who said she is at [facility] at this time. Patient expressed concern over her nutritional needs and how they are not being met. ACM advised patient to inform [facility] regarding vegetable patient is able to consume or have food brought to her. Patient said she understands. Patient  expressed her son is working 6 days a week and she has nobody to help care for her. ACM allowed a safe space for patient to express concerns. AC will alert PCP of patient in SNF and will follow patient once she d/c to [facility].
done

## 2023-11-11 NOTE — ED ADULT TRIAGE NOTE - MEANS OF ARRIVAL
Physical Therapy Cancellation Note         11/11/23 0754   Note Type   Note type Cancelled Session   Additional Comments PT orders received. Chart reviewed. Pt currently pending lower limb duplex. Will hold PT eval and follow up as appropriate.      Dylon Wright, PT ambulatory

## 2023-12-04 ENCOUNTER — RX RENEWAL (OUTPATIENT)
Age: 68
End: 2023-12-04

## 2023-12-04 RX ORDER — OLMESARTAN MEDOXOMIL 40 MG/1
40 TABLET, FILM COATED ORAL
Qty: 90 | Refills: 3 | Status: ACTIVE | COMMUNITY
Start: 2020-12-03 | End: 1900-01-01

## 2024-01-22 NOTE — H&P PST ADULT - PRIMARY CARE PROVIDER
Alert-The patient is alert, awake and responds to voice. The patient is oriented to time, place, and person. The triage nurse is able to obtain subjective information. DR MIRANDA   LV 1 WK

## 2024-04-12 ENCOUNTER — APPOINTMENT (OUTPATIENT)
Dept: UROLOGY | Facility: CLINIC | Age: 69
End: 2024-04-12
Payer: MEDICARE

## 2024-04-12 VITALS
WEIGHT: 205 LBS | RESPIRATION RATE: 18 BRPM | SYSTOLIC BLOOD PRESSURE: 138 MMHG | HEIGHT: 69 IN | OXYGEN SATURATION: 97 % | BODY MASS INDEX: 30.36 KG/M2 | HEART RATE: 80 BPM | DIASTOLIC BLOOD PRESSURE: 86 MMHG | TEMPERATURE: 98.4 F

## 2024-04-12 DIAGNOSIS — Z00.00 ENCOUNTER FOR GENERAL ADULT MEDICAL EXAMINATION W/OUT ABNORMAL FINDINGS: ICD-10-CM

## 2024-04-12 DIAGNOSIS — N48.1 BALANITIS: ICD-10-CM

## 2024-04-12 LAB
BILIRUB UR QL STRIP: NORMAL
COLLECTION METHOD: NORMAL
GLUCOSE UR-MCNC: NORMAL
HCG UR QL: 0.2 EU/DL
HGB UR QL STRIP.AUTO: NORMAL
KETONES UR-MCNC: NORMAL
LEUKOCYTE ESTERASE UR QL STRIP: NORMAL
NITRITE UR QL STRIP: NORMAL
PH UR STRIP: 6
PROT UR STRIP-MCNC: NORMAL
SP GR UR STRIP: 1.02

## 2024-04-12 PROCEDURE — 99214 OFFICE O/P EST MOD 30 MIN: CPT

## 2024-04-12 RX ORDER — NYSTATIN AND TRIAMCINOLONE ACETONIDE 100000; 1 MG/G; MG/G
100000-0.1 CREAM TOPICAL
Qty: 60 | Refills: 4 | Status: ACTIVE | COMMUNITY
Start: 2024-04-12 | End: 1900-01-01

## 2024-04-12 NOTE — PHYSICAL EXAM
[General Appearance - In No Acute Distress] : no acute distress [] : no respiratory distress [Oriented To Time, Place, And Person] : oriented to person, place, and time [de-identified] : Small subcentimeter area of erythema subcoronal.

## 2024-04-12 NOTE — HISTORY OF PRESENT ILLNESS
[FreeTextEntry1] : Few weeks of penile irritation in the subcoronal area of the penis self treated with first anti-inflammatory cream and then antifungal cream with marked improvement.  He does have some dysuria at the tip.  Urine analysis negative.  Last PSA 12/23/2023 3.15.

## 2024-04-12 NOTE — ASSESSMENT
[FreeTextEntry1] : Resolving balanitis likely yeast.  Will order Mycolog-II cream and follow-up as needed.  New PSA ordered.

## 2024-05-07 ENCOUNTER — LABORATORY RESULT (OUTPATIENT)
Age: 69
End: 2024-05-07

## 2024-05-08 DIAGNOSIS — R97.20 ELEVATED PROSTATE, SPECIFIC ANTIGEN [PSA]: ICD-10-CM

## 2024-05-08 LAB
ALBUMIN SERPL ELPH-MCNC: 4.8 G/DL
ALP BLD-CCNC: 54 U/L
ALT SERPL-CCNC: 26 U/L
ANION GAP SERPL CALC-SCNC: 14 MMOL/L
AST SERPL-CCNC: 22 U/L
BILIRUB SERPL-MCNC: 0.7 MG/DL
BUN SERPL-MCNC: 21 MG/DL
CALCIUM SERPL-MCNC: 9.3 MG/DL
CHLORIDE SERPL-SCNC: 105 MMOL/L
CHOLEST SERPL-MCNC: 138 MG/DL
CO2 SERPL-SCNC: 24 MMOL/L
CREAT SERPL-MCNC: 1.2 MG/DL
CRP SERPL HS-MCNC: 0.51 MG/L
EGFR: 66 ML/MIN/1.73M2
ESTIMATED AVERAGE GLUCOSE: 103 MG/DL
GLUCOSE SERPL-MCNC: 111 MG/DL
HBA1C MFR BLD HPLC: 5.2 %
HCT VFR BLD CALC: 45.3 %
HDLC SERPL-MCNC: 49 MG/DL
HGB BLD-MCNC: 15.1 G/DL
LDLC SERPL CALC-MCNC: 66 MG/DL
MCHC RBC-ENTMCNC: 31.3 PG
MCHC RBC-ENTMCNC: 33.3 G/DL
MCV RBC AUTO: 93.8 FL
NONHDLC SERPL-MCNC: 89 MG/DL
PLATELET # BLD AUTO: 181 K/UL
PMV BLD AUTO: 0 /100 WBCS
PMV BLD: 8.5 FL
POTASSIUM SERPL-SCNC: 4.6 MMOL/L
PROT SERPL-MCNC: 6.8 G/DL
PSA SERPL-MCNC: 3.75 NG/ML
RBC # BLD: 4.83 M/UL
RBC # FLD: 13.3 %
SODIUM SERPL-SCNC: 143 MMOL/L
TRIGL SERPL-MCNC: 113 MG/DL
WBC # FLD AUTO: 7.18 K/UL

## 2024-05-13 ENCOUNTER — RX RENEWAL (OUTPATIENT)
Age: 69
End: 2024-05-13

## 2024-05-13 RX ORDER — ROSUVASTATIN CALCIUM 40 MG/1
40 TABLET, FILM COATED ORAL
Qty: 90 | Refills: 3 | Status: ACTIVE | COMMUNITY
Start: 2020-10-05 | End: 1900-01-01

## 2024-05-22 ENCOUNTER — RESULT REVIEW (OUTPATIENT)
Age: 69
End: 2024-05-22

## 2024-05-22 ENCOUNTER — OUTPATIENT (OUTPATIENT)
Dept: OUTPATIENT SERVICES | Facility: HOSPITAL | Age: 69
LOS: 1 days | End: 2024-05-22
Payer: MEDICARE

## 2024-05-22 DIAGNOSIS — Z87.19 PERSONAL HISTORY OF OTHER DISEASES OF THE DIGESTIVE SYSTEM: Chronic | ICD-10-CM

## 2024-05-22 DIAGNOSIS — Z95.1 PRESENCE OF AORTOCORONARY BYPASS GRAFT: Chronic | ICD-10-CM

## 2024-05-22 DIAGNOSIS — R97.20 ELEVATED PROSTATE SPECIFIC ANTIGEN [PSA]: ICD-10-CM

## 2024-05-22 DIAGNOSIS — Z98.890 OTHER SPECIFIED POSTPROCEDURAL STATES: Chronic | ICD-10-CM

## 2024-05-22 PROCEDURE — 72197 MRI PELVIS W/O & W/DYE: CPT

## 2024-05-22 PROCEDURE — A9579: CPT

## 2024-05-22 PROCEDURE — 72197 MRI PELVIS W/O & W/DYE: CPT | Mod: 26,MH

## 2024-05-23 DIAGNOSIS — R97.20 ELEVATED PROSTATE SPECIFIC ANTIGEN [PSA]: ICD-10-CM

## 2024-10-09 ENCOUNTER — RX RENEWAL (OUTPATIENT)
Age: 69
End: 2024-10-09

## 2024-10-22 DIAGNOSIS — I25.9 CHRONIC ISCHEMIC HEART DISEASE, UNSPECIFIED: ICD-10-CM

## 2024-10-22 DIAGNOSIS — Z95.1 PRESENCE OF AORTOCORONARY BYPASS GRAFT: ICD-10-CM

## 2024-10-22 DIAGNOSIS — E78.5 HYPERLIPIDEMIA, UNSPECIFIED: ICD-10-CM

## 2024-10-22 DIAGNOSIS — I10 ESSENTIAL (PRIMARY) HYPERTENSION: ICD-10-CM

## 2024-12-19 ENCOUNTER — NON-APPOINTMENT (OUTPATIENT)
Age: 69
End: 2024-12-19

## 2025-02-05 ENCOUNTER — APPOINTMENT (OUTPATIENT)
Dept: CARDIOLOGY | Facility: CLINIC | Age: 70
End: 2025-02-05
Payer: MEDICARE

## 2025-02-05 VITALS
WEIGHT: 196 LBS | HEART RATE: 91 BPM | BODY MASS INDEX: 29.03 KG/M2 | DIASTOLIC BLOOD PRESSURE: 70 MMHG | SYSTOLIC BLOOD PRESSURE: 118 MMHG | HEIGHT: 69 IN

## 2025-02-05 DIAGNOSIS — I25.10 ATHEROSCLEROTIC HEART DISEASE OF NATIVE CORONARY ARTERY W/OUT ANGINA PECTORIS: ICD-10-CM

## 2025-02-05 DIAGNOSIS — Z95.1 PRESENCE OF AORTOCORONARY BYPASS GRAFT: ICD-10-CM

## 2025-02-05 DIAGNOSIS — I25.9 CHRONIC ISCHEMIC HEART DISEASE, UNSPECIFIED: ICD-10-CM

## 2025-02-05 DIAGNOSIS — I10 ESSENTIAL (PRIMARY) HYPERTENSION: ICD-10-CM

## 2025-02-05 DIAGNOSIS — E78.5 HYPERLIPIDEMIA, UNSPECIFIED: ICD-10-CM

## 2025-02-05 PROCEDURE — 93325 DOPPLER ECHO COLOR FLOW MAPG: CPT

## 2025-02-05 PROCEDURE — 93320 DOPPLER ECHO COMPLETE: CPT

## 2025-02-05 PROCEDURE — 99204 OFFICE O/P NEW MOD 45 MIN: CPT

## 2025-02-05 PROCEDURE — 93351 STRESS TTE COMPLETE: CPT

## 2025-02-05 RX ORDER — ASPIRIN ENTERIC COATED TABLETS 81 MG 81 MG/1
81 TABLET, DELAYED RELEASE ORAL
Qty: 90 | Refills: 1 | Status: ACTIVE | COMMUNITY
Start: 2025-02-05

## 2025-03-13 NOTE — ASU PATIENT PROFILE, ADULT - FALL HARM RISK - UNIVERSAL INTERVENTIONS
What Type Of Note Output Would You Prefer (Optional)?: Standard Output Hpi Title: Evaluation of Skin Lesions How Severe Are Your Spot(S)?: mild Have Your Spot(S) Been Treated In The Past?: has not been treated Family Member: Mother Additional History: Pt had melanoma on his back 2017, chest 2019, chest x2 2020. Bed in lowest position, wheels locked, appropriate side rails in place/Call bell, personal items and telephone in reach/Instruct patient to call for assistance before getting out of bed or chair/Non-slip footwear when patient is out of bed/[place] to call system/Physically safe environment - no spills, clutter or unnecessary equipment/Purposeful Proactive Rounding/Room/bathroom lighting operational, light cord in reach

## 2025-03-28 ENCOUNTER — RX RENEWAL (OUTPATIENT)
Age: 70
End: 2025-03-28

## 2025-07-10 ENCOUNTER — LABORATORY RESULT (OUTPATIENT)
Age: 70
End: 2025-07-10

## 2025-07-15 ENCOUNTER — NON-APPOINTMENT (OUTPATIENT)
Age: 70
End: 2025-07-15

## 2025-08-26 ENCOUNTER — RX RENEWAL (OUTPATIENT)
Age: 70
End: 2025-08-26